# Patient Record
Sex: MALE | Race: WHITE | NOT HISPANIC OR LATINO | URBAN - METROPOLITAN AREA
[De-identification: names, ages, dates, MRNs, and addresses within clinical notes are randomized per-mention and may not be internally consistent; named-entity substitution may affect disease eponyms.]

---

## 2017-07-05 ENCOUNTER — OFFICE VISIT (OUTPATIENT)
Dept: FAMILY MEDICINE | Facility: CLINIC | Age: 52
End: 2017-07-05

## 2017-07-05 VITALS
HEART RATE: 66 BPM | RESPIRATION RATE: 16 BRPM | BODY MASS INDEX: 30.01 KG/M2 | TEMPERATURE: 98.2 F | DIASTOLIC BLOOD PRESSURE: 88 MMHG | HEIGHT: 68 IN | SYSTOLIC BLOOD PRESSURE: 138 MMHG | OXYGEN SATURATION: 98 % | WEIGHT: 198 LBS

## 2017-07-05 DIAGNOSIS — Z01.818 PREOP GENERAL PHYSICAL EXAM: Primary | ICD-10-CM

## 2017-07-05 DIAGNOSIS — M16.11 PRIMARY OSTEOARTHRITIS OF RIGHT HIP: ICD-10-CM

## 2017-07-05 DIAGNOSIS — J45.20 MILD INTERMITTENT ASTHMA WITHOUT COMPLICATION: ICD-10-CM

## 2017-07-05 LAB
% GRANULOCYTES: 78.4 % (ref 42.2–75.2)
HCT VFR BLD AUTO: 43.7 % (ref 39–51)
HEMOGLOBIN: 14.6 G/DL (ref 13.4–17.5)
LYMPHOCYTES NFR BLD AUTO: 16.9 % (ref 20.5–51.1)
MCH RBC QN AUTO: 28.5 PG (ref 27–31)
MCHC RBC AUTO-ENTMCNC: 33.4 G/DL (ref 33–37)
MCV RBC AUTO: 85.4 FL (ref 80–100)
MONOCYTES NFR BLD AUTO: 4.7 % (ref 1.7–9.3)
PLATELET # BLD AUTO: 244 K/UL (ref 140–450)
RBC # BLD AUTO: 5.11 X10/CMM (ref 4.2–5.9)
WBC # BLD AUTO: 8.2 X10/CMM (ref 3.8–11)

## 2017-07-05 PROCEDURE — 85025 COMPLETE CBC W/AUTO DIFF WBC: CPT | Performed by: FAMILY MEDICINE

## 2017-07-05 PROCEDURE — 99215 OFFICE O/P EST HI 40 MIN: CPT | Performed by: FAMILY MEDICINE

## 2017-07-05 PROCEDURE — 36415 COLL VENOUS BLD VENIPUNCTURE: CPT | Performed by: FAMILY MEDICINE

## 2017-07-05 PROCEDURE — 93000 ELECTROCARDIOGRAM COMPLETE: CPT | Performed by: FAMILY MEDICINE

## 2017-07-05 NOTE — PROGRESS NOTES
[facility]  [address] [place] [id number] [phone]  Dept: [phone]    PRE-OP EVALUATION:  Today's date: 2017    Bill Saunders (: 1965) presents for pre-operative evaluation assessment as requested by Dr. ROMERO.  He requires evaluation and anesthesia risk assessment prior to undergoing surgery/procedure for treatment of RIGHT HIP, BONE ON BONE.  Proposed procedure: TOTAL RIGHT HIP REPLACEMENT    Date of Surgery/ Procedure: 2017  Time of Surgery/ Procedure: 7:00 AM  Hospital/Surgical Facility: [facility].     Primary Physician: Mariusz Orellana  Type of Anesthesia Anticipated: to be determined    Patient has a Health Care Directive or Living Will:  YES     1. NO - Do you have a history of heart attack, stroke, stent, bypass or surgery on an artery in the head, neck, heart or legs?  2. NO - Do you ever have any pain or discomfort in your chest?  3. NO - Do you have a history of  Heart Failure?  4. NO - Are you troubled by shortness of breath when: walking on the level, up a slight hill or at night?  5. NO - Do you currently have a cold, bronchitis or other respiratory infection?  6. NO - Do you have a cough, shortness of breath or wheezing?  7. NO - Do you sometimes get pains in the calves of your legs when you walk?  8. NO - Do you or anyone in your family have previous history of blood clots?  9. NO - Do you or does anyone in your family have a serious bleeding problem such as prolonged bleeding following surgeries or cuts?  10. NO - Have you ever had problems with anemia or been told to take iron pills?  11. NO - Have you had any abnormal blood loss such as black, tarry or bloody stools, or abnormal vaginal bleeding?  12. NO - Have you ever had a blood transfusion?  13. NO - Have you or any of your relatives ever had problems with anesthesia?  14. NO - Do you have sleep apnea, excessive snoring or daytime drowsiness?  15. NO - Do you have any prosthetic  heart valves?  16. NO - Do you have prosthetic joints?        HPI:                                                      Brief HPI related to upcoming procedure: end stage OA of right hip    50 yo male with intermittent asthma, well controlled with allergic component      MEDICAL HISTORY:                                                      Patient Active Problem List    Diagnosis Date Noted     Mild intermittent asthma without complication 01/19/2016     Priority: Medium     Rotator cuff injury, right, subsequent encounter 01/19/2016     Priority: Medium     CARDIOVASCULAR SCREENING; LDL GOAL LESS THAN 130 10/31/2010     Family history of prostate cancer 10/27/2009     Dermatophytosis of nail 12/11/2007     Essential hypertension, benign 11/04/2004      Past Medical History:   Diagnosis Date     Essential hypertension, benign      Mild persistent asthma      Past Surgical History:   Procedure Laterality Date     HC REPAIR OF NASAL SEPTUM  1982    repair fracture     REPAIR TENDON ACHILLES  5/1/2010    Left Achilles Tendon     SURGICAL HISTORY OF -   1998    extensor tendon repair right hand     Current Outpatient Prescriptions   Medication Sig Dispense Refill     montelukast (SINGULAIR) 10 MG tablet Take 1 tablet (10 mg) by mouth daily 90 tablet 3     albuterol (PROAIR HFA, PROVENTIL HFA, VENTOLIN HFA) 108 (90 BASE) MCG/ACT inhaler Inhale 2 puffs into the lungs every 6 hours as needed for shortness of breath / dyspnea or wheezing 3 Inhaler 1     Multiple Vitamin (DAILY MULTIVITAMIN PO) Take  by mouth daily.       OTC products: None, except as noted above    Allergies   Allergen Reactions     Iodine Anaphylaxis      Latex Allergy: NO    Social History   Substance Use Topics     Smoking status: Never Smoker     Smokeless tobacco: Never Used     Alcohol use 3.0 oz/week     6 Cans of beer per week     History   Drug Use No       REVIEW OF SYSTEMS:                                                    C: NEGATIVE for fever,  "chills, change in weight  I: NEGATIVE for worrisome rashes, moles or lesions  E: NEGATIVE for vision changes or irritation  E/M: NEGATIVE for ear, mouth and throat problems  R: NEGATIVE for significant cough or SOB  RESP:uses albuterol about once a month  CV: NEGATIVE for chest pain, palpitations or peripheral edema  GI: NEGATIVE for nausea, abdominal pain, heartburn, or change in bowel habits  : NEGATIVE for frequency, dysuria, or hematuria  M: NEGATIVE for significant arthralgias or myalgia  N: NEGATIVE for weakness, dizziness or paresthesias  E: NEGATIVE for temperature intolerance, skin/hair changes  H: NEGATIVE for bleeding problems  P: NEGATIVE for changes in mood or affect    EXAM:                                                    Pulse 66  Temp 98.2  F (36.8  C) (Oral)  Resp 16  Ht 1.727 m (5' 8\")  Wt 89.8 kg (198 lb)  SpO2 98%  BMI 30.11 kg/m2    GENERAL APPEARANCE: healthy, alert and no distress     EYES: EOMI,  PERRL     HENT: ear canals and TM's normal and nose and mouth without ulcers or lesions     NECK: no adenopathy, no asymmetry, masses, or scars and thyroid normal to palpation     RESP: lungs clear to auscultation - no rales, rhonchi or wheezes     CV: regular rates and rhythm, normal S1 S2, no S3 or S4 and no murmur, click or rub     ABDOMEN:  soft, nontender, no HSM or masses and bowel sounds normal     MS: extremities normal- no gross deformities noted, no evidence of inflammation in joints, FROM in all extremities.     SKIN: no suspicious lesions or rashes     NEURO: Normal strength and tone, sensory exam grossly normal, mentation intact and speech normal     PSYCH: mentation appears normal. and affect normal/bright     LYMPHATICS: No axillary, cervical, or supraclavicular nodes    DIAGNOSTICS:                                                    EKG: RBBB, NSR, normal axis, normal intervals, no acute ST/T changes c/w ischemia, no LVH by voltage criteria, unchanged from previous " tracings    Recent Labs   Lab Test  01/19/16   0914  12/17/14   1403  12/17/14   1359  03/21/12   0858  11/30/10   0843   HGB  14.9  14.4   --    --    --    PLT  243  232   --    --    --    NA   --    --    --   148*  138   POTASSIUM   --    --   4.5  4.2  4.6   CR   --    --    --   0.83  0.84      Lab Results   Component Value Date    WBC 8.2 07/05/2017    WBC 5.7 05/25/2010     Lab Results   Component Value Date    RBC 5.11 07/05/2017    RBC 5.14 05/25/2010     Lab Results   Component Value Date    HGB 14.6 07/05/2017     Lab Results   Component Value Date    HCT 43.7 07/05/2017     No components found for: MCT  Lab Results   Component Value Date    MCV 85.4 07/05/2017     Lab Results   Component Value Date    MCH 28.5 07/05/2017     Lab Results   Component Value Date    MCHC 33.4 07/05/2017     Lab Results   Component Value Date    RDW 12.8 05/25/2010     Lab Results   Component Value Date     07/05/2017           IMPRESSION:                                                    Reason for surgery/procedure: end stage arthritis of right hip  Diagnosis/reason for consult: generally healthy male with mild intermittent asthma    The proposed surgical procedure is considered INTERMEDIATE risk.    REVISED CARDIAC RISK INDEX  The patient has the following serious cardiovascular risks for perioperative complications such as (MI, PE, VFib and 3  AV Block):  No serious cardiac risks  INTERPRETATION: 0 risks: Class I (very low risk - 0.4% complication rate)    The patient has the following additional risks for perioperative complications:  No identified additional risks    No diagnosis found.    RECOMMENDATIONS:                                                          --Patient is to take all scheduled medications on the day of surgery EXCEPT for modifications listed below.    Anticoagulant or Antiplatelet Medication Use  NSAIDS: Ibuprofen (Motrin):         Stop one week prior to surgery        APPROVAL GIVEN to  proceed with proposed procedure, without further diagnostic evaluation       Signed Electronically by: Mariusz Orellana MD    Copy of this evaluation report is provided to requesting physician.

## 2017-07-05 NOTE — MR AVS SNAPSHOT
After Visit Summary   7/5/2017    Bill Saunders    MRN: [id number]           Patient Information     Date Of Birth          1965        Visit Information        Provider Department      7/5/2017 10:45 AM Mariusz Orellana MD [facility]        Today's Diagnoses     Preop general physical exam    -  1    Primary osteoarthritis of right hip        Mild intermittent asthma without complication          Care Instructions      Before Your Surgery      Call your surgeon if there is any change in your health. This includes signs of a cold or flu (such as a sore throat, runny nose, cough, rash or fever).    Do not smoke, drink alcohol or take over the counter medicine (unless your surgeon or primary care doctor tells you to) for the 24 hours before and after surgery.    If you take prescribed drugs: Follow your doctor s orders about which medicines to take and which to stop until after surgery.    Eating and drinking prior to surgery: follow the instructions from your surgeon    Take a shower or bath the night before surgery. Use the soap your surgeon gave you to gently clean your skin. If you do not have soap from your surgeon, use your regular soap. Do not shave or scrub the surgery site.  Wear clean pajamas and have clean sheets on your bed.           Follow-ups after your visit        Who to contact     If you have questions or need follow up information about today's clinic visit or your schedule please contact [facility] directly at [phone].  Normal or non-critical lab and imaging results will be communicated to you by MyChart, letter or phone within 4 business days after the clinic has received the results. If you do not hear from us within 7 days, please contact the clinic through MyChart or phone. If you have a critical or abnormal lab result, we will notify you by phone as soon as possible.  Submit refill requests through [employer] or call your pharmacy and they will  "forward the refill request to us. Please allow 3 business days for your refill to be completed.          Additional Information About Your Visit        [employer] Information     [employer] gives you secure access to your electronic health record. If you see a primary care provider, you can also send messages to your care team and make appointments. If you have questions, please call your primary care clinic.  If you do not have a primary care provider, please call [phone] and they will assist you.        Care EveryWhere ID     This is your Care EveryWhere ID. This could be used by other organizations to access your [place] medical records  [id number]        Your Vitals Were     Pulse Temperature Respirations Height Pulse Oximetry BMI (Body Mass Index)    66 98.2  F (36.8  C) (Oral) 16 1.727 m (5' 8\") 98% 30.11 kg/m2       Blood Pressure from Last 3 Encounters:   07/05/17 138/88   01/19/16 140/86   12/17/14 132/78    Weight from Last 3 Encounters:   07/05/17 89.8 kg (198 lb)   01/19/16 89.8 kg (198 lb)   12/17/14 88.5 kg (195 lb)              We Performed the Following     CBC with Diff/Plt ([facility])     EKG 12-lead complete w/read - Clinics        Primary Care Provider Office Phone # Fax #    Mariusz Orellana [phone]       [facility] [address] [facility] [address]        Equal Access to Services     CARMEN LOYOLA AH: Hadii aad ku hadasho Soomaali, waaxda luqadaha, qaybta kaalmada adeegyada, arlin craig haysarahn adeangie estrella la'aan ah. So [facility] [phone].    ATENCIÓN: Si habla español, tiene a webber disposición servicios gratuitos de asistencia lingüística. Sarah al [phone].    We comply with applicable federal civil rights laws and [place] laws. We do not discriminate on the basis of race, color, national origin, age, disability sex, sexual orientation or gender identity.            Thank you!     Thank you for choosing [facility]  for your care. Our goal is " always to provide you with excellent care. Hearing back from our patients is one way we can continue to improve our services. Please take a few minutes to complete the written survey that you may receive in the mail after your visit with us. Thank you!             Your Updated Medication List - Protect others around you: Learn how to safely use, store and throw away your medicines at www.disposemymeds.org.          This list is accurate as of: 7/5/17 12:07 PM.  Always use your most recent med list.                   Brand Name Dispense Instructions for use Diagnosis    albuterol 108 (90 BASE) MCG/ACT Inhaler    PROAIR HFA/PROVENTIL HFA/VENTOLIN HFA    3 Inhaler    Inhale 2 puffs into the lungs every 6 hours as needed for shortness of breath / dyspnea or wheezing    Mild intermittent asthma without complication       DAILY MULTIVITAMIN PO      Take  by mouth daily.    Routine general medical examination at a health care facility       FISH OIL OMEGA-3 PO           Glucosamine--250 MG Tabs

## 2017-10-24 ENCOUNTER — TRANSFERRED RECORDS (OUTPATIENT)
Dept: FAMILY MEDICINE | Facility: CLINIC | Age: 52
End: 2017-10-24

## 2018-01-06 LAB
CHOLEST SERPL-MCNC: 139 MG/DL (ref 0–200)
CREAT SERPL-MCNC: 0.85 MG/DL (ref 0.7–1.33)
HDLC SERPL-MCNC: 39 MG/DL (ref 40–?)
LDLC SERPL CALC-MCNC: 84 MG/DL (ref 0–100)
TRIGL SERPL-MCNC: 74 MG/DL (ref 0–150)

## 2018-07-26 ENCOUNTER — TRANSFERRED RECORDS (OUTPATIENT)
Dept: FAMILY MEDICINE | Facility: CLINIC | Age: 53
End: 2018-07-26

## 2018-07-31 ENCOUNTER — MEDICAL CORRESPONDENCE (OUTPATIENT)
Dept: HEALTH INFORMATION MANAGEMENT | Facility: CLINIC | Age: 53
End: 2018-07-31

## 2018-08-07 ENCOUNTER — HOSPITAL ENCOUNTER (OUTPATIENT)
Dept: LAB | Facility: CLINIC | Age: 53
Discharge: HOME | End: 2018-08-07
Attending: ORTHOPAEDIC SURGERY | Admitting: ORTHOPAEDIC SURGERY
Payer: COMMERCIAL

## 2018-08-07 DIAGNOSIS — Z00.00 ROUTINE GENERAL MEDICAL EXAMINATION AT A HEALTH CARE FACILITY: Primary | ICD-10-CM

## 2018-08-07 LAB — HGB BLD-MCNC: 15 G/DL (ref 13.3–17.7)

## 2018-08-07 PROCEDURE — 85018 HEMOGLOBIN: CPT | Performed by: ORTHOPAEDIC SURGERY

## 2018-08-07 PROCEDURE — 36415 COLL VENOUS BLD VENIPUNCTURE: CPT | Performed by: ORTHOPAEDIC SURGERY

## 2018-10-25 ENCOUNTER — OFFICE VISIT (OUTPATIENT)
Dept: FAMILY MEDICINE | Facility: CLINIC | Age: 53
End: 2018-10-25

## 2018-10-25 ENCOUNTER — TRANSFERRED RECORDS (OUTPATIENT)
Dept: FAMILY MEDICINE | Facility: CLINIC | Age: 53
End: 2018-10-25

## 2018-10-25 VITALS
RESPIRATION RATE: 16 BRPM | WEIGHT: 207.8 LBS | OXYGEN SATURATION: 97 % | TEMPERATURE: 95.8 F | HEIGHT: 68 IN | SYSTOLIC BLOOD PRESSURE: 132 MMHG | BODY MASS INDEX: 31.49 KG/M2 | HEART RATE: 58 BPM | DIASTOLIC BLOOD PRESSURE: 92 MMHG

## 2018-10-25 DIAGNOSIS — I10 ESSENTIAL HYPERTENSION, BENIGN: ICD-10-CM

## 2018-10-25 DIAGNOSIS — M25.552 HIP PAIN, LEFT: ICD-10-CM

## 2018-10-25 DIAGNOSIS — Z23 NEED FOR PROPHYLACTIC VACCINATION AND INOCULATION AGAINST INFLUENZA: ICD-10-CM

## 2018-10-25 DIAGNOSIS — J45.30 MILD PERSISTENT ASTHMA WITHOUT COMPLICATION: ICD-10-CM

## 2018-10-25 DIAGNOSIS — Z01.818 PREOP GENERAL PHYSICAL EXAM: Primary | ICD-10-CM

## 2018-10-25 LAB
% GRANULOCYTES: 66.2 % (ref 42.2–75.2)
HCT VFR BLD AUTO: 44.6 % (ref 39–51)
HEMOGLOBIN: 14.6 G/DL (ref 13.4–17.5)
LYMPHOCYTES NFR BLD AUTO: 26.1 % (ref 20.5–51.1)
MCH RBC QN AUTO: 28.3 PG (ref 27–31)
MCHC RBC AUTO-ENTMCNC: 32.9 G/DL (ref 33–37)
MCV RBC AUTO: 86.2 FL (ref 80–100)
MONOCYTES NFR BLD AUTO: 7.7 % (ref 1.7–9.3)
PLATELET # BLD AUTO: 252 K/UL (ref 140–450)
RBC # BLD AUTO: 5.18 X10/CMM (ref 4.2–5.9)
WBC # BLD AUTO: 6.4 X10/CMM (ref 3.8–11)

## 2018-10-25 PROCEDURE — 93000 ELECTROCARDIOGRAM COMPLETE: CPT | Performed by: FAMILY MEDICINE

## 2018-10-25 PROCEDURE — 90686 IIV4 VACC NO PRSV 0.5 ML IM: CPT | Performed by: FAMILY MEDICINE

## 2018-10-25 PROCEDURE — 87081 CULTURE SCREEN ONLY: CPT | Mod: 90 | Performed by: FAMILY MEDICINE

## 2018-10-25 PROCEDURE — 99215 OFFICE O/P EST HI 40 MIN: CPT | Mod: 25 | Performed by: FAMILY MEDICINE

## 2018-10-25 PROCEDURE — 90471 IMMUNIZATION ADMIN: CPT | Performed by: FAMILY MEDICINE

## 2018-10-25 PROCEDURE — 85025 COMPLETE CBC W/AUTO DIFF WBC: CPT | Performed by: FAMILY MEDICINE

## 2018-10-25 PROCEDURE — 36415 COLL VENOUS BLD VENIPUNCTURE: CPT | Performed by: FAMILY MEDICINE

## 2018-10-25 RX ORDER — MONTELUKAST SODIUM 10 MG/1
1 TABLET ORAL DAILY
Qty: 90 TABLET | Refills: 0 | Status: SHIPPED | OUTPATIENT
Start: 2018-10-25 | End: 2018-12-27

## 2018-10-25 RX ORDER — LISINOPRIL 5 MG/1
5 TABLET ORAL DAILY
Qty: 30 TABLET | Refills: 1 | Status: SHIPPED | OUTPATIENT
Start: 2018-10-25 | End: 2018-12-13

## 2018-10-25 RX ORDER — ALBUTEROL SULFATE 90 UG/1
2 AEROSOL, METERED RESPIRATORY (INHALATION) EVERY 6 HOURS PRN
Qty: 3 INHALER | Refills: 1 | Status: SHIPPED | OUTPATIENT
Start: 2018-10-25 | End: 2020-09-02

## 2018-10-25 NOTE — LETTER
My Asthma Action Plan  Name: Bill Saunders   YOB: 1965  Date: 10/25/2018   My doctor: Juan C Mo MD   My clinic: [facility]        My Control Medicine: Montelukast (Singulair) -  10 mg tablet  My Rescue Medicine: Albuterol (Proair/Ventolin/Proventil) inhaler 108   My Asthma Severity: intermittent  Avoid your asthma triggers: Patient is unaware of triggers               GREEN ZONE   Good Control    I feel good    No cough or wheeze    Can work, sleep and play without asthma symptoms       Take your asthma control medicine every day.     1. If exercise triggers your asthma, take your rescue medication    15 minutes before exercise or sports, and    During exercise if you have asthma symptoms  2. Spacer to use with inhaler: If you have a spacer, make sure to use it with your inhaler             YELLOW ZONE Getting Worse  I have ANY of these:    I do not feel good    Cough or wheeze    Chest feels tight    Wake up at night   1. Keep taking your Green Zone medications  2. Start taking your rescue medicine:    every 20 minutes for up to 1 hour. Then every 4 hours for 24-48 hours.  3. If you stay in the Yellow Zone for more than 12-24 hours, contact your doctor.  4. If you do not return to the Green Zone in 12-24 hours or you get worse, start taking your oral steroid medicine if prescribed by your provider.           RED ZONE Medical Alert - Get Help  I have ANY of these:    I feel awful    Medicine is not helping    Breathing getting harder    Trouble walking or talking    Nose opens wide to breathe       1. Take your rescue medicine NOW  2. If your provider has prescribed an oral steroid medicine, start taking it NOW  3. Call your doctor NOW  4. If you are still in the Red Zone after 20 minutes and you have not reached your doctor:    Take your rescue medicine again and    Call 911 or go to the emergency room right away    See your regular doctor within 2 weeks of an Emergency Room or  Urgent Care visit for follow-up treatment.          Annual Reminders:  Meet with Asthma Educator,  Flu Shot in the Fall, consider Pneumonia Vaccination for patients with asthma (aged 19 and older).    Pharmacy:    [facility] - [facility] [address]  WRITTEN PRESCRIPTION REQUESTED  [facility] - [place], [place] - [address]                      Asthma Triggers  How To Control Things That Make Your Asthma Worse    Triggers are things that make your asthma worse.  Look at the list below to help you find your triggers and what you can do about them.  You can help prevent asthma flare-ups by staying away from your triggers.      Trigger                                                          What you can do   Cigarette Smoke  Tobacco smoke can make asthma worse. Do not allow smoking in your home, car or around you.  Be sure no one smokes at a child s day care or school.  If you smoke, ask your health care provider for ways to help you quit.  Ask family members to quit too.  Ask your health care provider for a referral to Quit Plan to help you quit smoking, or call 0-943-836-PLAN.     Colds, Flu, Bronchitis  These are common triggers of asthma. Wash your hands often.  Don t touch your eyes, nose or mouth.  Get a flu shot every year.     Dust Mites  These are tiny bugs that live in cloth or carpet. They are too small to see. Wash sheets and blankets in hot water every week.   Encase pillows and mattress in dust mite proof covers.  Avoid having carpet if you can. If you have carpet, vacuum weekly.   Use a dust mask and HEPA vacuum.   Pollen and Outdoor Mold  Some people are allergic to trees, grass, or weed pollen, or molds. Try to keep your windows closed.  Limit time out doors when pollen count is high.   Ask you health care provider about taking medicine during allergy season.     Animal Dander  Some people are allergic to skin flakes, urine or saliva from pets with  fur or feathers. Keep pets with fur or feathers out of your home.    If you can t keep the pet outdoors, then keep the pet out of your bedroom.  Keep the bedroom door closed.  Keep pets off cloth furniture and away from stuffed toys.     Mice, Rats, and Cockroaches  Some people are allergic to the waste from these pests.   Cover food and garbage.  Clean up spills and food crumbs.  Store grease in the refrigerator.   Keep food out of the bedroom.   Indoor Mold  This can be a trigger if your home has high moisture. Fix leaking faucets, pipes, or other sources of water.   Clean moldy surfaces.  Dehumidify basement if it is damp and smelly.   Smoke, Strong Odors, and Sprays  These can reduce air quality. Stay away from strong odors and sprays, such as perfume, powder, hair spray, paints, smoke incense, paint, cleaning products, candles and new carpet.   Exercise or Sports  Some people with asthma have this trigger. Be active!  Ask your doctor about taking medicine before sports or exercise to prevent symptoms.    Warm up for 5-10 minutes before and after sports or exercise.     Other Triggers of Asthma  Cold air:  Cover your nose and mouth with a scarf.  Sometimes laughing or crying can be a trigger.  Some medicines and food can trigger asthma.

## 2018-10-25 NOTE — MR AVS SNAPSHOT
After Visit Summary   10/25/2018    Bill Saunders    MRN: [id number]           Patient Information     Date Of Birth          1965        Visit Information        Provider Department      10/25/2018 3:00 PM Juan C Mo MD [facility]        Today's Diagnoses     Preop general physical exam    -  1    Essential hypertension, benign        Hip pain, left        Need for prophylactic vaccination and inoculation against influenza        Mild persistent asthma without complication          Care Instructions      Before Your Surgery      Call your surgeon if there is any change in your health. This includes signs of a cold or flu (such as a sore throat, runny nose, cough, rash or fever).    Do not smoke, drink alcohol or take over the counter medicine (unless your surgeon or primary care doctor tells you to) for the 24 hours before and after surgery.    If you take prescribed drugs: Follow your doctor s orders about which medicines to take and which to stop until after surgery.    Eating and drinking prior to surgery: follow the instructions from your surgeon    Take a shower or bath the night before surgery. Use the soap your surgeon gave you to gently clean your skin. If you do not have soap from your surgeon, use your regular soap. Do not shave or scrub the surgery site.  Wear clean pajamas and have clean sheets on your bed.           Follow-ups after your visit        Who to contact     If you have questions or need follow up information about today's clinic visit or your schedule please contact [facility] directly at [phone].  Normal or non-critical lab and imaging results will be communicated to you by MyChart, letter or phone within 4 business days after the clinic has received the results. If you do not hear from us within 7 days, please contact the clinic through MyChart or phone. If you have a critical or abnormal lab result, we will notify you by phone as  "soon as possible.  Submit refill requests through mVisum or call your pharmacy and they will forward the refill request to us. Please allow 3 business days for your refill to be completed.          Additional Information About Your Visit        [email or web address] Information     [employer]um gives you secure access to your electronic health record. If you see a primary care provider, you can also send messages to your care team and make appointments. If you have questions, please call your primary care clinic.  If you do not have a primary care provider, please call [phone] and they will assist you.        Care EveryWhere ID     This is your Care EveryWhere ID. This could be used by other organizations to access your [facility] medical records  [id number]        Your Vitals Were     Pulse Temperature Respirations Height Pulse Oximetry BMI (Body Mass Index)    58 95.8  F (35.4  C) (Oral) 16 1.722 m (5' 7.79\") 97% 31.8 kg/m2       Blood Pressure from Last 3 Encounters:   10/25/18 (!) 132/92   07/05/17 138/88   01/19/16 140/86    Weight from Last 3 Encounters:   10/25/18 94.3 kg (207 lb 12.8 oz)   07/05/17 89.8 kg (198 lb)   01/19/16 89.8 kg (198 lb)              We Performed the Following     Basic Metabolic Panel (8) ([facility])     CBC with Diff/Plt ([facility])     EKG 12-lead complete w/read - Clinics     FLU VACCINE, SPLIT VIRUS, IM (QUADRIVALENT) [26705]- >3 YRS     MRSA Screening Culture ([facility])     Vaccine Administration, Initial [25733]          Today's Medication Changes          These changes are accurate as of 10/25/18  3:55 PM.  If you have any questions, ask your nurse or doctor.               Start taking these medicines.        Dose/Directions    lisinopril 5 MG tablet   Commonly known as:  PRINIVIL/ZESTRIL   Used for:  Essential hypertension, benign   Started by:  Juan C Mo MD        Dose:  5 mg   Take 1 tablet (5 mg) by mouth daily   Quantity:  30 tablet   Refills:  1       montelukast 10 MG tablet "   Commonly known as:  SINGULAIR   Used for:  Mild persistent asthma without complication   Started by:  Juan C Mo MD        Dose:  1 tablet   Take 1 tablet (10 mg) by mouth daily   Quantity:  90 tablet   Refills:  0         These medicines have changed or have updated prescriptions.        Dose/Directions    * albuterol 108 (90 Base) MCG/ACT inhaler   Commonly known as:  PROAIR HFA/PROVENTIL HFA/VENTOLIN HFA   This may have changed:  Another medication with the same name was added. Make sure you understand how and when to take each.   Used for:  Mild intermittent asthma without complication   Changed by:  Juan C Mo MD        Dose:  2 puff   Inhale 2 puffs into the lungs every 6 hours as needed for shortness of breath / dyspnea or wheezing   Quantity:  3 Inhaler   Refills:  1       * albuterol 108 (90 Base) MCG/ACT inhaler   Commonly known as:  PROAIR HFA/PROVENTIL HFA/VENTOLIN HFA   This may have changed:  You were already taking a medication with the same name, and this prescription was added. Make sure you understand how and when to take each.   Used for:  Mild persistent asthma without complication   Changed by:  Juan C Mo MD        Dose:  2 puff   Inhale 2 puffs into the lungs every 6 hours as needed for shortness of breath / dyspnea or wheezing   Quantity:  3 Inhaler   Refills:  1       * Notice:  This list has 2 medication(s) that are the same as other medications prescribed for you. Read the directions carefully, and ask your doctor or other care provider to review them with you.         Where to get your medicines      These medications were sent to [place] [address] [place] [id number] [address]     Phone:  [phone]     albuterol 108 (90 Base) MCG/ACT inhaler    lisinopril 5 MG tablet    montelukast 10 MG tablet                Primary Care Provider Office Phone # Fax #    Juan C Mo [phone]        [address]  [facility] [address]        Equal Access to Services     JESUSNARA MILLA : Hadii aad gallito hadjulisao Sodudleyali, waaxda luqadaha, qaybta kaalmada natalyarenatoleticia, arlin craig abdiluis m cordovaangie estrella hyacinth acuna. So [facility] [phone].    ATENCIÓN: Si habla español, tiene a webber disposición servicios gratuitos de asistencia lingüística. Llame al [phone].    We comply with applicable federal civil rights laws and [place] laws. We do not discriminate on the basis of race, color, national origin, age, disability, sex, sexual orientation, or gender identity.            Thank you!     Thank you for choosing [facility]  for your care. Our goal is always to provide you with excellent care. Hearing back from our patients is one way we can continue to improve our services. Please take a few minutes to complete the written survey that you may receive in the mail after your visit with us. Thank you!             Your Updated Medication List - Protect others around you: Learn how to safely use, store and throw away your medicines at www.disposemymeds.org.          This list is accurate as of 10/25/18  3:55 PM.  Always use your most recent med list.                   Brand Name Dispense Instructions for use Diagnosis    * albuterol 108 (90 Base) MCG/ACT inhaler    PROAIR HFA/PROVENTIL HFA/VENTOLIN HFA    3 Inhaler    Inhale 2 puffs into the lungs every 6 hours as needed for shortness of breath / dyspnea or wheezing    Mild intermittent asthma without complication       * albuterol 108 (90 Base) MCG/ACT inhaler    PROAIR HFA/PROVENTIL HFA/VENTOLIN HFA    3 Inhaler    Inhale 2 puffs into the lungs every 6 hours as needed for shortness of breath / dyspnea or wheezing    Mild persistent asthma without complication       DAILY MULTIVITAMIN PO      Take  by mouth daily.    Routine general medical examination at a health care facility       FISH OIL OMEGA-3 PO           Glucosamine--250 MG Tabs            lisinopril 5 MG tablet    PRINIVIL/ZESTRIL    30 tablet    Take 1 tablet (5 mg) by mouth daily    Essential hypertension, benign       montelukast 10 MG tablet    SINGULAIR    90 tablet    Take 1 tablet (10 mg) by mouth daily    Mild persistent asthma without complication       VITAMIN B-12 PO           * Notice:  This list has 2 medication(s) that are the same as other medications prescribed for you. Read the directions carefully, and ask your doctor or other care provider to review them with you.

## 2018-10-25 NOTE — PROGRESS NOTES
[facility]  [address] [place] [id number] [phone]  Dept: [phone]    PRE-OP EVALUATION:  Today's date: 10/25/2018    Bill Saunders (: 1965) presents for pre-operative evaluation assessment as requested by Dr. Deacon Puente.  He requires evaluation and anesthesia risk assessment prior to undergoing surgery/procedure for treatment of LEFT Hip Pain .    Proposed Surgery/ Procedure: LEFT Hip Replacement  Date of Surgery/ Procedure: 18  Time of Surgery/ Procedure: 09  Hospital/Surgical Facility: [facility]  Fax number for surgical facility: [phone]  Primary Physician: Juan C Mo  Type of Anesthesia Anticipated: to be determined    Patient has a Health Care Directive or Living Will:  YES     1. NO - Do you have a history of heart attack, stroke, stent, bypass or surgery on an artery in the head, neck, heart or legs?  2. NO - Do you ever have any pain or discomfort in your chest?  3. NO - Do you have a history of  Heart Failure?  4. NO - Are you troubled by shortness of breath when: walking on the level, up a slight hill or at night?  5. NO - Do you currently have a cold, bronchitis or other respiratory infection?  6. NO - Do you have a cough, shortness of breath or wheezing?  7. NO - Do you sometimes get pains in the calves of your legs when you walk?  8. NO - Do you or anyone in your family have previous history of blood clots?  9. NO - Do you or does anyone in your family have a serious bleeding problem such as prolonged bleeding following surgeries or cuts?  10. NO - Have you ever had problems with anemia or been told to take iron pills?  11. NO - Have you had any abnormal blood loss such as black, tarry or bloody stools, or abnormal vaginal bleeding?  12. YES - HAVE YOU EVER HAD A BLOOD TRANSFUSION? When a baby  13. NO - Have you or any of your relatives ever had problems with anesthesia?  14. NO - Do you have sleep apnea, excessive snoring or daytime  drowsiness?  15. NO - Do you have any prosthetic heart valves?  16. YES - DO YOU HAVE PROSTHETIC JOINTS? Right Hip  17. NO - Is there any chance that you may be pregnant?      HPI:     HPI related to upcoming procedure: OA of the left hip, had JOÃO on the right and now ready to have JOÃO on the left      See problem list for active medical problems.  Problems all longstanding and stable, except as noted/documented.  See ROS for pertinent symptoms related to these conditions.                                                                                                                                                          .    MEDICAL HISTORY:     Patient Active Problem List    Diagnosis Date Noted     Mild intermittent asthma without complication 01/19/2016     Priority: Medium     Rotator cuff injury, right, subsequent encounter 01/19/2016     Priority: Medium     CARDIOVASCULAR SCREENING; LDL GOAL LESS THAN 130 10/31/2010     Priority: Medium     Family history of prostate cancer 10/27/2009     Priority: Medium     Dermatophytosis of nail 12/11/2007     Priority: Medium     Essential hypertension, benign 11/04/2004     Priority: Medium      Past Medical History:   Diagnosis Date     Essential hypertension, benign      Mild persistent asthma      Past Surgical History:   Procedure Laterality Date     HC REPAIR OF NASAL SEPTUM  1982    repair fracture     REPAIR TENDON ACHILLES  5/1/2010    Left Achilles Tendon     SURGICAL HISTORY OF -   1998    extensor tendon repair right hand     Current Outpatient Prescriptions   Medication Sig Dispense Refill     albuterol (PROAIR HFA, PROVENTIL HFA, VENTOLIN HFA) 108 (90 BASE) MCG/ACT inhaler Inhale 2 puffs into the lungs every 6 hours as needed for shortness of breath / dyspnea or wheezing 3 Inhaler 1     albuterol (PROAIR HFA/PROVENTIL HFA/VENTOLIN HFA) 108 (90 Base) MCG/ACT inhaler Inhale 2 puffs into the lungs every 6 hours as needed for shortness of breath / dyspnea or  "wheezing 3 Inhaler 1     Cyanocobalamin (VITAMIN B-12 PO)        Glucosamine HCl-MSM (GLUCOSAMINE-MSM) 375-250 MG TABS        lisinopril (PRINIVIL/ZESTRIL) 5 MG tablet Take 1 tablet (5 mg) by mouth daily 30 tablet 1     montelukast (SINGULAIR) 10 MG tablet Take 1 tablet (10 mg) by mouth daily 90 tablet 0     Multiple Vitamin (DAILY MULTIVITAMIN PO) Take  by mouth daily.       Omega-3 Fatty Acids (FISH OIL OMEGA-3 PO)        OTC products: None, except as noted above    Allergies   Allergen Reactions     Other (Do Not Use) Anaphylaxis     PN: LW Other1: -seafood: anaphylatic reaction     Iodine Anaphylaxis      Latex Allergy: NO    Social History   Substance Use Topics     Smoking status: Never Smoker     Smokeless tobacco: Never Used     Alcohol use 3.0 oz/week     6 Cans of beer per week      Comment: 7 per week     History   Drug Use No       REVIEW OF SYSTEMS:   Constitutional, neuro, ENT, endocrine, pulmonary, cardiac, gastrointestinal, genitourinary, musculoskeletal, integument and psychiatric systems are negative, except as otherwise noted.    EXAM:   BP (!) 132/92  Pulse 58  Temp 95.8  F (35.4  C) (Oral)  Resp 16  Ht 1.722 m (5' 7.79\")  Wt 94.3 kg (207 lb 12.8 oz)  SpO2 97%  BMI 31.8 kg/m2    GENERAL APPEARANCE: healthy, alert and no distress     EYES: EOMI,  PERRL     HENT: ear canals and TM's normal and nose and mouth without ulcers or lesions     NECK: no adenopathy, no asymmetry, masses, or scars and thyroid normal to palpation     RESP: lungs clear to auscultation - no rales, rhonchi or wheezes     CV: regular rates and rhythm, normal S1 S2, no S3 or S4 and no murmur, click or rub     ABDOMEN:  soft, nontender, no HSM or masses and bowel sounds normal     MS: extremities normal- no gross deformities noted, no evidence of inflammation in joints, FROM in all extremities.     SKIN: no suspicious lesions or rashes     NEURO: Normal strength and tone, sensory exam grossly normal, mentation intact and " speech normal     PSYCH: mentation appears normal. and affect normal/bright     LYMPHATICS: No cervical adenopathy    DIAGNOSTICS:     EKG: appears normal, NSR, normal axis, normal intervals, no acute ST/T changes c/w ischemia, no LVH by voltage criteria, unchanged from previous tracings  Labs Resulted Today:   Results for orders placed or performed in visit on 10/25/18   CBC with Diff/Plt ([facility])   Result Value Ref Range    WBC x10/cmm 6.4 3.8 - 11.0 x10/cmm    % Lymphocytes 26.1 20.5 - 51.1 %    % Monocytes 7.7 1.7 - 9.3 %    % Granulocytes 66.2 42.2 - 75.2 %    RBC x10/cmm 5.18 4.2 - 5.9 x10/cmm    Hemoglobin 14.6 13.4 - 17.5 g/dl    Hematocrit 44.6 39 - 51 %    MCV 86.2 80 - 100 fL    MCH 28.3 27.0 - 31.0 pg    MCHC 32.9 (A) 33.0 - 37.0 g/dL    Platelet Count 252 140 - 450 K/uL       Recent Labs   Lab Test  08/07/18   1518  07/05/17   1136  12/17/16   0845  01/19/16   0914   12/17/14   1359  03/21/12   0858  11/30/10   0843   HGB  15.0  14.6   --   14.9   < >   --    --    --    PLT   --   244   --   243   < >   --    --    --    NA   --    --    --    --    --    --   148*  138   POTASSIUM   --    --    --    --    --   4.5  4.2  4.6   CR   --    --   0.84   --    --    --   0.83  0.84    < > = values in this interval not displayed.        IMPRESSION:   Reason for surgery/procedure: OA of Left Hip    The proposed surgical procedure is considered INTERMEDIATE risk.    REVISED CARDIAC RISK INDEX  The patient has the following serious cardiovascular risks for perioperative complications such as (MI, PE, VFib and 3  AV Block):  No serious cardiac risks  INTERPRETATION: 0 risks: Class I (very low risk - 0.4% complication rate)    The patient has the following additional risks for perioperative complications:  No identified additional risks      ICD-10-CM    1. Preop general physical exam Z01.818 CBC with Diff/Plt (G)     EKG 12-lead complete w/read - Clinics     Basic Metabolic Panel (8) ([facility])     MRSA Screening  Culture ([facility])   2. Essential hypertension, benign I10 CBC with Diff/Plt ([facility])     EKG 12-lead complete w/read - Clinics     Basic Metabolic Panel (8) ([facility])     lisinopril (PRINIVIL/ZESTRIL) 5 MG tablet   3. Hip pain, left M25.552 MRSA Screening Culture ([facility])   4. Need for prophylactic vaccination and inoculation against influenza Z23 FLU VACCINE, SPLIT VIRUS, IM (QUADRIVALENT) [82041]- >3 YRS     Vaccine Administration, Initial [87160]   5. Mild persistent asthma without complication J45.30 albuterol (PROAIR HFA/PROVENTIL HFA/VENTOLIN HFA) 108 (90 Base) MCG/ACT inhaler     montelukast (SINGULAIR) 10 MG tablet       RECOMMENDATIONS:     --Consult hospital rounder / IM to assist post-op medical management    --Patient is to take all scheduled medications on the day of surgery EXCEPT for modifications listed below.    APPROVAL GIVEN to proceed with proposed procedure, without further diagnostic evaluation       Signed Electronically by: JuanC Mo MD    Copy of this evaluation report is provided to requesting physician.    [facility] Preop Guidelines    Revised Cardiac Risk Index    Injectable Influenza Immunization Documentation    1.  Is the person to be vaccinated sick today?   No    2. Does the person to be vaccinated have an allergy to a component   of the vaccine?   No  Egg Allergy Algorithm Link    3. Has the person to be vaccinated ever had a serious reaction   to influenza vaccine in the past?   No    4. Has the person to be vaccinated ever had Guillain-Barré syndrome?   No    Form completed by Melissa Ernandez CMA

## 2018-10-26 LAB
BUN SERPL-MCNC: 22 MG/DL (ref 6–24)
BUN/CREATININE RATIO: 29 (ref 9–20)
CALCIUM SERPL-MCNC: 8.8 MG/DL (ref 8.7–10.2)
CHLORIDE SERPLBLD-SCNC: 101 MMOL/L (ref 96–106)
CREAT SERPL-MCNC: 0.77 MG/DL (ref 0.76–1.27)
EGFR IF AFRICN AM: 119 ML/MIN/1.73
EGFR IF NONAFRICN AM: 103 ML/MIN/1.73
GLUCOSE SERPL-MCNC: 83 MG/DL (ref 65–99)
POTASSIUM SERPL-SCNC: 4.4 MMOL/L (ref 3.5–5.2)
SODIUM SERPL-SCNC: 140 MMOL/L (ref 134–144)
TOTAL CO2: 25 MMOL/L (ref 20–29)

## 2018-10-26 ASSESSMENT — ASTHMA QUESTIONNAIRES: ACT_TOTALSCORE: 23

## 2018-10-29 LAB — MRSA SCREENING CULTURE: NEGATIVE

## 2018-10-29 NOTE — PROGRESS NOTES
Dear Bill,   I am writing to report that your included test results are within expected ranges. I do not suggest that we make any changes at this time.    uJan C Mo M.D.

## 2018-11-02 NOTE — PROGRESS NOTES
10/29/18 faxed preop and EKG to [facility] @ [phone]    Yobani Regalado,   [facility]  [phone]

## 2018-11-05 ENCOUNTER — OFFICE VISIT (OUTPATIENT)
Dept: FAMILY MEDICINE | Facility: CLINIC | Age: 53
End: 2018-11-05

## 2018-11-05 VITALS
SYSTOLIC BLOOD PRESSURE: 142 MMHG | DIASTOLIC BLOOD PRESSURE: 88 MMHG | WEIGHT: 207 LBS | OXYGEN SATURATION: 96 % | HEART RATE: 74 BPM | TEMPERATURE: 98.2 F | BODY MASS INDEX: 31.67 KG/M2 | RESPIRATION RATE: 16 BRPM

## 2018-11-05 DIAGNOSIS — L03.012 PARONYCHIA OF LEFT THUMB: Primary | ICD-10-CM

## 2018-11-05 PROCEDURE — 99213 OFFICE O/P EST LOW 20 MIN: CPT | Performed by: FAMILY MEDICINE

## 2018-11-05 RX ORDER — CELECOXIB 200 MG/1
CAPSULE ORAL
Refills: 0 | COMMUNITY
Start: 2018-10-31 | End: 2021-11-30

## 2018-11-05 NOTE — PROGRESS NOTES
SUBJECTIVE:   Bill Saunders is a 53 year old male who presents to clinic today for the following health issues:      Infected finger  left hand # thumb nail finger      Duration: yesterday    Description (location/character/radiation): Left thumb, throbbing, discolored, painful, no fever    Job concrete    Intensity:  severe    Accompanying signs and symptoms: none    History (similar episodes/previous evaluation): None    Precipitating or alleviating factors: None    Therapies tried and outcome: soak in epsom salt no relief     Was deer hunting last night and got one.    Per his report he has a dental appointment tonight for which he plans to take Amoxicillin 500 mg 4 tabs as he was directed by other provider.  We discussed that would give him coverage for staph and strep that might be affecting the thumb skin.   In addition I would have him do some bleach soaks and avoid manipulation of the skin/nail at this time.  He is to report fever, increased redness or pain and be seen again should that happen.      Problem list and histories reviewed & adjusted, as indicated.  Additional history: as documented    Patient Active Problem List   Diagnosis     Essential hypertension, benign     Dermatophytosis of nail     Family history of prostate cancer     CARDIOVASCULAR SCREENING; LDL GOAL LESS THAN 130     Mild intermittent asthma without complication     Rotator cuff injury, right, subsequent encounter     Past Surgical History:   Procedure Laterality Date     HC REPAIR OF NASAL SEPTUM  1982    repair fracture     REPAIR TENDON ACHILLES  5/1/2010    Left Achilles Tendon     SURGICAL HISTORY OF -   1998    extensor tendon repair right hand       Social History   Substance Use Topics     Smoking status: Never Smoker     Smokeless tobacco: Never Used     Alcohol use 3.0 oz/week     6 Cans of beer per week      Comment: 7 per week     Family History   Problem Relation Age of Onset     Hypertension Mother      Diabetes  Mother      RICCARDO. Mother      By pass   of CHF AGE 75     Cancer Father      small cell Ca metastasized, BLADDER CA     Prostate Cancer Father 65      age 82     Melanoma Father      Depression Sister      Depression Brother      RICCARDO. Maternal Uncle          Current Outpatient Prescriptions   Medication Sig Dispense Refill     albuterol (PROAIR HFA, PROVENTIL HFA, VENTOLIN HFA) 108 (90 BASE) MCG/ACT inhaler Inhale 2 puffs into the lungs every 6 hours as needed for shortness of breath / dyspnea or wheezing 3 Inhaler 1     albuterol (PROAIR HFA/PROVENTIL HFA/VENTOLIN HFA) 108 (90 Base) MCG/ACT inhaler Inhale 2 puffs into the lungs every 6 hours as needed for shortness of breath / dyspnea or wheezing 3 Inhaler 1     Cyanocobalamin (VITAMIN B-12 PO)        Glucosamine HCl-MSM (GLUCOSAMINE-MSM) 375-250 MG TABS        lisinopril (PRINIVIL/ZESTRIL) 5 MG tablet Take 1 tablet (5 mg) by mouth daily 30 tablet 1     montelukast (SINGULAIR) 10 MG tablet Take 1 tablet (10 mg) by mouth daily 90 tablet 0     Multiple Vitamin (DAILY MULTIVITAMIN PO) Take  by mouth daily.       Omega-3 Fatty Acids (FISH OIL OMEGA-3 PO)        Allergies   Allergen Reactions     Other (Do Not Use) Anaphylaxis     PN: LW Other1: -seafood: anaphylatic reaction     Iodine Anaphylaxis     Recent Labs   Lab Test  10/25/18   1600  16   0845  14   1359  12   0858  11/30/10   0843   LDL   --   107   --   102  117   HDL   --   51   --   42  40   TRIG   --   75   --   84  63   ALT   --    --    --   27  33   CR  0.77  0.84   --   0.83  0.84   GFRESTIMATED   --   101   --   >90  >90   GFRESTBLACK   --   117   --   >90  >90   POTASSIUM  4.4   --   4.5  4.2  4.6      BP Readings from Last 3 Encounters:   18 142/88   10/25/18 (!) 132/92   17 138/88    Wt Readings from Last 3 Encounters:   18 93.9 kg (207 lb)   10/25/18 94.3 kg (207 lb 12.8 oz)   17 89.8 kg (198 lb)                  Labs reviewed in  EPIC    Reviewed and updated as needed this visit by clinical staff       Reviewed and updated as needed this visit by Provider         ROS:  Constitutional, HEENT, cardiovascular, pulmonary, GI, , musculoskeletal, neuro, skin, endocrine and psych systems are negative, except as otherwise noted.    OBJECTIVE:     /88  Pulse 74  Temp 98.2  F (36.8  C)  Resp 16  Wt 93.9 kg (207 lb)  SpO2 96%  BMI 31.67 kg/m2  There is no height or weight on file to calculate BMI.  GENERAL: healthy, alert and no distress  Left base of thumb nail pink without pus tender mildly to palpation at tip of thumb. ?black streak undernail.  Normal pulses.  Normal ROM hand/finger/thumb.  No streaking.        Diagnostic Test Results:  Results for orders placed or performed in visit on 10/25/18   CBC with Diff/Plt (G)   Result Value Ref Range    WBC x10/cmm 6.4 3.8 - 11.0 x10/cmm    % Lymphocytes 26.1 20.5 - 51.1 %    % Monocytes 7.7 1.7 - 9.3 %    % Granulocytes 66.2 42.2 - 75.2 %    RBC x10/cmm 5.18 4.2 - 5.9 x10/cmm    Hemoglobin 14.6 13.4 - 17.5 g/dl    Hematocrit 44.6 39 - 51 %    MCV 86.2 80 - 100 fL    MCH 28.3 27.0 - 31.0 pg    MCHC 32.9 (A) 33.0 - 37.0 g/dL    Platelet Count 252 140 - 450 K/uL   Basic Metabolic Panel (8) ([facility])   Result Value Ref Range    Glucose 83 65 - 99 mg/dL    Urea Nitrogen 22 6 - 24 mg/dL    Creatinine 0.77 0.76 - 1.27 mg/dL    eGFR If NonAfricn Am 103 >59 mL/min/1.73    eGFR If Africn Am 119 >59 mL/min/1.73    BUN/Creatinine Ratio 29 (H) 9 - 20    Sodium 140 134 - 144 mmol/L    Potassium 4.4 3.5 - 5.2 mmol/L    Chloride 101 96 - 106 mmol/L    Total CO2 25 20 - 29 mmol/L    Calcium 8.8 8.7 - 10.2 mg/dL    Narrative    Performed at:  01 - [facility] [address], [place], [place]  [id number]  : Galindo Bermudez MD, Phone:  [phone]   MRSA Screening Culture ([facility])   Result Value Ref Range    MRSA Screening Culture Negative     Narrative    Performed at:  01 - [facility] [address]  [place], [place]  [id number]  : Galindo Bermudez MD, Phone:  [phone]       ASSESSMENT/PLAN:     ASSESSMENT / PLAN:  (L03.012) Paronychia of left thumb  (primary encounter diagnosis)  Comment: early mild  Plan: see patient instructions        Patient Instructions   You are planning to take Amoxicillin 500 mg 4 caps one hour before dentist tonight (this should cover bacterial for your thumb as well)    You could also do bleach soak. 1 teaspoon bleach. (no poking, sticking it with a pin or cutting nail/cuticle.)    If fever, redness, streaking be seen again.          Veronica Blevins MD  [facility]

## 2018-11-05 NOTE — MR AVS SNAPSHOT
After Visit Summary   11/5/2018    Bill Saunders    MRN: [id number]           Patient Information     Date Of Birth          1965        Visit Information        Provider Department      11/5/2018 10:30 AM Veronica Blevins MD [facility]        Care Instructions    You are planning to take Amoxicillin 500 mg 4 caps one hour before dentist tonight    You could also do bleach soak. 1 teaspoon bleach.     If fever, redness, streaking be seen again.              Follow-ups after your visit        Who to contact     If you have questions or need follow up information about today's clinic visit or your schedule please contact [facility] directly at [phone].  Normal or non-critical lab and imaging results will be communicated to you by Scribzhart, letter or phone within 4 business days after the clinic has received the results. If you do not hear from us within 7 days, please contact the clinic through [employer] or phone. If you have a critical or abnormal lab result, we will notify you by phone as soon as possible.  Submit refill requests through [employer] or call your pharmacy and they will forward the refill request to us. Please allow 3 business days for your refill to be completed.          Additional Information About Your Visit        MyChart Information     [employer] gives you secure access to your electronic health record. If you see a primary care provider, you can also send messages to your care team and make appointments. If you have questions, please call your primary care clinic.  If you do not have a primary care provider, please call [phone] and they will assist you.        Care EveryWhere ID     This is your Care EveryWhere ID. This could be used by other organizations to access your [place] medical records  [id number]        Your Vitals Were     Pulse Temperature Respirations Pulse Oximetry BMI (Body Mass Index)       74 98.2  F (36.8  C) 16 96% 31.67  kg/m2        Blood Pressure from Last 3 Encounters:   11/05/18 142/88   10/25/18 (!) 132/92   07/05/17 138/88    Weight from Last 3 Encounters:   11/05/18 93.9 kg (207 lb)   10/25/18 94.3 kg (207 lb 12.8 oz)   07/05/17 89.8 kg (198 lb)              Today, you had the following     No orders found for display       Primary Care Provider Office Phone # Fax #    Juan C Mo [phone] [address]  [facility] [address]        Equal Access to Services     [facility]: Hadii lorenzo conti hadthanh Somalena, waaxda luqadaha, qaybta kaalmada adeangieyada, arlin claros . So [facility] [phone].    ATENCIÓN: Si habla español, tiene a webber disposición servicios gratuitos de asistencia lingüística. [facility] [phone].    We comply with applicable federal civil rights laws and [place] laws. We do not discriminate on the basis of race, color, national origin, age, disability, sex, sexual orientation, or gender identity.            Thank you!     Thank you for choosing [facility]  for your care. Our goal is always to provide you with excellent care. Hearing back from our patients is one way we can continue to improve our services. Please take a few minutes to complete the written survey that you may receive in the mail after your visit with us. Thank you!             Your Updated Medication List - Protect others around you: Learn how to safely use, store and throw away your medicines at www.disposemymeds.org.          This list is accurate as of 11/5/18 11:04 AM.  Always use your most recent med list.                   Brand Name Dispense Instructions for use Diagnosis    * albuterol 108 (90 Base) MCG/ACT inhaler    PROAIR HFA/PROVENTIL HFA/VENTOLIN HFA    3 Inhaler    Inhale 2 puffs into the lungs every 6 hours as needed for shortness of breath / dyspnea or wheezing    Mild intermittent asthma without complication       * albuterol 108 (90 Base) MCG/ACT  inhaler    PROAIR HFA/PROVENTIL HFA/VENTOLIN HFA    3 Inhaler    Inhale 2 puffs into the lungs every 6 hours as needed for shortness of breath / dyspnea or wheezing    Mild persistent asthma without complication       celecoxib 200 MG capsule    celeBREX     TAKE ONE CAPSULE BY MOUTH ONCE DAILY        DAILY MULTIVITAMIN PO      Take  by mouth daily.    Routine general medical examination at a health care facility       FISH OIL OMEGA-3 PO           Glucosamine--250 MG Tabs           lisinopril 5 MG tablet    PRINIVIL/ZESTRIL    30 tablet    Take 1 tablet (5 mg) by mouth daily    Essential hypertension, benign       montelukast 10 MG tablet    SINGULAIR    90 tablet    Take 1 tablet (10 mg) by mouth daily    Mild persistent asthma without complication       VITAMIN B-12 PO           * Notice:  This list has 2 medication(s) that are the same as other medications prescribed for you. Read the directions carefully, and ask your doctor or other care provider to review them with you.

## 2018-11-05 NOTE — PATIENT INSTRUCTIONS
You are planning to take Amoxicillin 500 mg 4 caps one hour before dentist tonight (this should cover bacterial for your thumb as well)    You could also do bleach soak. 1 teaspoon bleach. (no poking, sticking it with a pin or cutting nail/cuticle.)    If fever, redness, streaking be seen again.

## 2018-11-09 ENCOUNTER — TRANSFERRED RECORDS (OUTPATIENT)
Dept: FAMILY MEDICINE | Facility: CLINIC | Age: 53
End: 2018-11-09

## 2018-12-11 ENCOUNTER — TRANSFERRED RECORDS (OUTPATIENT)
Dept: FAMILY MEDICINE | Facility: CLINIC | Age: 53
End: 2018-12-11

## 2018-12-13 DIAGNOSIS — I10 ESSENTIAL HYPERTENSION, BENIGN: ICD-10-CM

## 2018-12-13 RX ORDER — LISINOPRIL 5 MG/1
5 TABLET ORAL DAILY
Qty: 30 TABLET | Refills: 1 | Status: SHIPPED | OUTPATIENT
Start: 2018-12-13 | End: 2018-12-17

## 2018-12-17 DIAGNOSIS — I10 ESSENTIAL HYPERTENSION, BENIGN: ICD-10-CM

## 2018-12-18 RX ORDER — LISINOPRIL 5 MG/1
5 TABLET ORAL DAILY
Qty: 90 TABLET | Refills: 3 | Status: SHIPPED | OUTPATIENT
Start: 2018-12-18 | End: 2020-01-12

## 2018-12-27 DIAGNOSIS — J45.30 MILD PERSISTENT ASTHMA WITHOUT COMPLICATION: ICD-10-CM

## 2018-12-27 RX ORDER — MONTELUKAST SODIUM 10 MG/1
1 TABLET ORAL DAILY
Qty: 90 TABLET | Refills: 0 | Status: SHIPPED | OUTPATIENT
Start: 2018-12-27 | End: 2020-12-11

## 2019-01-22 ENCOUNTER — TRANSFERRED RECORDS (OUTPATIENT)
Dept: FAMILY MEDICINE | Facility: CLINIC | Age: 54
End: 2019-01-22

## 2019-02-07 ENCOUNTER — APPOINTMENT (OUTPATIENT)
Dept: NUCLEAR MEDICINE | Facility: CLINIC | Age: 54
End: 2019-02-07
Attending: EMERGENCY MEDICINE
Payer: COMMERCIAL

## 2019-02-07 ENCOUNTER — APPOINTMENT (OUTPATIENT)
Dept: GENERAL RADIOLOGY | Facility: CLINIC | Age: 54
End: 2019-02-07
Attending: EMERGENCY MEDICINE
Payer: COMMERCIAL

## 2019-02-07 ENCOUNTER — HOSPITAL ENCOUNTER (EMERGENCY)
Facility: CLINIC | Age: 54
Discharge: HOME | End: 2019-02-07
Attending: EMERGENCY MEDICINE | Admitting: EMERGENCY MEDICINE
Payer: COMMERCIAL

## 2019-02-07 VITALS
WEIGHT: 205 LBS | SYSTOLIC BLOOD PRESSURE: 140 MMHG | DIASTOLIC BLOOD PRESSURE: 82 MMHG | HEIGHT: 69 IN | RESPIRATION RATE: 14 BRPM | OXYGEN SATURATION: 98 % | BODY MASS INDEX: 30.36 KG/M2 | TEMPERATURE: 97.5 F | HEART RATE: 63 BPM

## 2019-02-07 DIAGNOSIS — R07.9 CHEST PAIN, UNSPECIFIED TYPE: ICD-10-CM

## 2019-02-07 LAB
ANION GAP SERPL CALCULATED.3IONS-SCNC: 5 MMOL/L (ref 3–14)
BASOPHILS # BLD AUTO: 0 10E9/L (ref 0–0.2)
BASOPHILS NFR BLD AUTO: 0.2 %
BUN SERPL-MCNC: 21 MG/DL (ref 7–30)
CALCIUM SERPL-MCNC: 8.4 MG/DL (ref 8.5–10.1)
CHLORIDE SERPL-SCNC: 109 MMOL/L (ref 94–109)
CO2 SERPL-SCNC: 26 MMOL/L (ref 20–32)
CREAT SERPL-MCNC: 0.73 MG/DL (ref 0.66–1.25)
D DIMER PPP FEU-MCNC: 1.1 UG/ML FEU (ref 0–0.5)
DIFFERENTIAL METHOD BLD: NORMAL
EOSINOPHIL # BLD AUTO: 0.1 10E9/L (ref 0–0.7)
EOSINOPHIL NFR BLD AUTO: 1.8 %
ERYTHROCYTE [DISTWIDTH] IN BLOOD BY AUTOMATED COUNT: 13.5 % (ref 10–15)
GFR SERPL CREATININE-BSD FRML MDRD: >90 ML/MIN/{1.73_M2}
GLUCOSE SERPL-MCNC: 95 MG/DL (ref 70–99)
HCT VFR BLD AUTO: 42.3 % (ref 40–53)
HGB BLD-MCNC: 14.5 G/DL (ref 13.3–17.7)
IMM GRANULOCYTES # BLD: 0 10E9/L (ref 0–0.4)
IMM GRANULOCYTES NFR BLD: 0.2 %
INTERPRETATION ECG - MUSE: NORMAL
LYMPHOCYTES # BLD AUTO: 1.3 10E9/L (ref 0.8–5.3)
LYMPHOCYTES NFR BLD AUTO: 23.1 %
MCH RBC QN AUTO: 29.4 PG (ref 26.5–33)
MCHC RBC AUTO-ENTMCNC: 34.3 G/DL (ref 31.5–36.5)
MCV RBC AUTO: 86 FL (ref 78–100)
MONOCYTES # BLD AUTO: 0.6 10E9/L (ref 0–1.3)
MONOCYTES NFR BLD AUTO: 11.2 %
NEUTROPHILS # BLD AUTO: 3.5 10E9/L (ref 1.6–8.3)
NEUTROPHILS NFR BLD AUTO: 63.5 %
NRBC # BLD AUTO: 0 10*3/UL
NRBC BLD AUTO-RTO: 0 /100
PLATELET # BLD AUTO: 234 10E9/L (ref 150–450)
POTASSIUM SERPL-SCNC: 4.3 MMOL/L (ref 3.4–5.3)
RBC # BLD AUTO: 4.94 10E12/L (ref 4.4–5.9)
SODIUM SERPL-SCNC: 140 MMOL/L (ref 133–144)
TROPONIN I SERPL-MCNC: <0.015 UG/L (ref 0–0.04)
WBC # BLD AUTO: 5.5 10E9/L (ref 4–11)

## 2019-02-07 PROCEDURE — 78580 LUNG PERFUSION IMAGING: CPT

## 2019-02-07 PROCEDURE — 93005 ELECTROCARDIOGRAM TRACING: CPT

## 2019-02-07 PROCEDURE — 99285 EMERGENCY DEPT VISIT HI MDM: CPT | Mod: 25

## 2019-02-07 PROCEDURE — 71045 X-RAY EXAM CHEST 1 VIEW: CPT

## 2019-02-07 PROCEDURE — 84484 ASSAY OF TROPONIN QUANT: CPT | Performed by: EMERGENCY MEDICINE

## 2019-02-07 PROCEDURE — 85025 COMPLETE CBC W/AUTO DIFF WBC: CPT | Performed by: EMERGENCY MEDICINE

## 2019-02-07 PROCEDURE — 25000132 ZZH RX MED GY IP 250 OP 250 PS 637: Performed by: EMERGENCY MEDICINE

## 2019-02-07 PROCEDURE — A9540 TC99M MAA: HCPCS | Performed by: EMERGENCY MEDICINE

## 2019-02-07 PROCEDURE — 80048 BASIC METABOLIC PNL TOTAL CA: CPT | Performed by: EMERGENCY MEDICINE

## 2019-02-07 PROCEDURE — 85379 FIBRIN DEGRADATION QUANT: CPT | Performed by: EMERGENCY MEDICINE

## 2019-02-07 PROCEDURE — 34300033 ZZH RX 343: Performed by: EMERGENCY MEDICINE

## 2019-02-07 RX ORDER — ASPIRIN 81 MG/1
324 TABLET, CHEWABLE ORAL ONCE
Status: COMPLETED | OUTPATIENT
Start: 2019-02-07 | End: 2019-02-07

## 2019-02-07 RX ADMIN — Medication 3.1 MILLICURIE: at 08:42

## 2019-02-07 RX ADMIN — ASPIRIN 81 MG 324 MG: 81 TABLET ORAL at 07:22

## 2019-02-07 ASSESSMENT — ENCOUNTER SYMPTOMS
SHORTNESS OF BREATH: 1
NAUSEA: 0
VOMITING: 0
DIZZINESS: 0

## 2019-02-07 ASSESSMENT — MIFFLIN-ST. JEOR: SCORE: 1760.25

## 2019-02-07 NOTE — ED AVS SNAPSHOT
Emergency Department  [address] [place] [address]  Phone:  [phone]  Fax:  [phone]                                    Bill Saunders   MRN: [id number]    Department:   Emergency Department   Date of Visit:  2/7/2019           After Visit Summary Signature Page    I have received my discharge instructions, and my questions have been answered. I have discussed any challenges I see with this plan with the nurse or doctor.    ..........................................................................................................................................  Patient/Patient Representative Signature      ..........................................................................................................................................  Patient Representative Print Name and Relationship to Patient    ..................................................               ................................................  Date                                   Time    ..........................................................................................................................................  Reviewed by Signature/Title    ...................................................              ..............................................  Date                                               Time          22EPIC Rev 08/18

## 2019-02-07 NOTE — ED PROVIDER NOTES
"  History     Chief Complaint:  Chest pain    HPI   Bill Saunders is a 53 year old male with a history of hypertension and asthma who presents with his wife to the emergency department with concern for chest pain. The patient reports that he has intermittent sharp \"pencil point\" left sided chest pain which then radiates outward and becomes dull. He states that he works out regularly and does not have this pain ever while working out. This morning he woke up with a dull left sided chest pain, which gradually increased, prompting him to take an ibuprofen over an hour ago, and with persistence of his pain, he drove here, and en route his pain subsided partially. He notes some shortness of breath over the last several months, but states he is always short of breath in the winter, which he attributes to his history of asthma. He denies any leg swelling, nausea, or emesis. He notes dizziness four days ago when working out. Of note, his last stress test was years ago.     Cardiac/PE/DVT Risk Factors:  The patient has a history of hypertension, but not hyperlipidemia, diabetes, and formerly smoking. He reports no family history of heart disease. The patient denies any personal or familial history of PE, DVT, or clotting disorder. The patient had a 3hour car trip (each way) 10 days ago and had a hip replacement several months ago on .        Allergies:  Iodine    Medications:    Albuterol  Lisinopril  Singulair  Celebrex     Past Medical History:    HTN  Asthma    Past Surgical History:    Repair nasal septum fracture  Left achilles tendon  Extensor tendon repair, right hand    Family History:    HTN  CAD  DM  Depression    Social History:  Former smoker  A beers per week     Review of Systems   Respiratory: Positive for shortness of breath.    Cardiovascular: Positive for chest pain. Negative for leg swelling.   Gastrointestinal: Negative for nausea and vomiting.   Neurological: Negative for dizziness. " "  All other systems reviewed and are negative.      Physical Exam     Patient Vitals for the past 24 hrs:   BP Temp Temp src Heart Rate Resp SpO2 Height Weight   02/07/19 0759 -- -- -- 66 13 97 % -- --   02/07/19 0738 -- -- -- 67 14 96 % -- --   02/07/19 0727 -- -- -- 67 -- 96 % -- --   02/07/19 0726 -- -- -- 71 14 -- -- --   02/07/19 0652 152/89 97.5  F (36.4  C) Oral 81 16 100 % 1.753 m (5' 9\") 93 kg (205 lb)         Physical Exam  General/Appearance: appears stated age, well-groomed, appears comfortable  Eyes: EOMI, no scleral injection, no icterus  ENT: MMM  Neck: supple, nl ROM, no stiffness  Cardiovascular: RRR, nl S1S2, no m/r/g, 2+ pulses in all 4 extremities, cap refill <2sec  Respiratory: CTAB, good air movement throughout, no wheezes/rhonchi/rales, no increased WOB, no retractions  Back: no lesions  GI: abd soft, non-distended, nttp,  no HSM, no rebound, no guarding, nl BS  MSK: PERDUE, good tone, no bony abnormality  Skin: warm and well-perfused, no rash, no edema, no ecchymosis, nl turgor  Neuro: GCS 15, alert and oriented, no gross focal neuro deficits  Psych: interacts appropriately  Heme: no petechia, no purpura, no active bleeding    Emergency Department Course   ECG:  Indication: chest pain   Time: 0703  Vent. Rate 72 bpm. VT interval 146. QRS duration 134. QT/QTc 416/455. P-R-T axis 57 47 43.  Normal sinus rhythm. Normal RBBB. Abnormal ECG.RBBB newly noted compared to EKG 9.11.2008. Read time: 0710.     Imaging:  Radiographic findings were communicated with the patient and family who voiced understanding of the findings.    XR chest portable  No acute cardiopulmonary disease.    NM Lung scan perfusion particulate  Low probability for pulmonary embolism.      Laboratory:  CBC: WBC: 5.5, HGB: 14.5, PLT: 234  BMP: , Calcium: 8.4, o/w WNL (Creatinine: 0.73)    0707 Troponin: <0.015   D dimer: 1.1  Interventions:  0722 Aspirin 324 mg Oral    Medications   aspirin (ASA) chewable tablet 324 mg (324 mg Oral " Given 2/7/19 0722)   technetium pertechnetate with albumin (Tc99m MAA) radioisotope injection 3 millicurie (3.1 millicuries Intravenous Given 2/7/19 0842)       Emergency Department Course:  Nursing notes and vitals reviewed. (2351) I performed an exam of the patient as documented above.     IV inserted. Medicine administered as documented above. Blood drawn. This was sent to the lab for further testing, results above.    (2962) I am informed that radiology will not obtain CT because of a possible former allergy to iodine.     The patient was sent for a NM Chest while in the emergency department, findings above.     (3374) I rechecked the patient and discussed the results of his workup thus far.       I personally reviewed the imaging results with the Patient and spouse and answered all related questions prior to discharge.       Impression & Plan      Medical Decision Making:  This patient is a 53-year-old male with history of hypertension who presents with chest pain this been intermittent over the past several weeks.  Notably it has not been associated with exertion.  It is atypical in its pattern of presentation and that it spontaneous and spontaneously resolves.  He has noted some increased shortness of breath over the past several weeks to months and notably did have a right hip replacement back in November.  He also took a 3-6-hour car trip 10 days ago.  Because of this I think a d-dimer was warranted.  His number was elevated.  A CTA was originally ordered however he has a possible anaphylactic allergy to iodine.  It is unclear, per his report, if this was truly an allergic reaction to Betadine versus an anxiety reaction to the situation.  I have advised them to at least discuss with her physician the possibility of getting allergy tested for this as there are many tests where, such as CT for acute stroke, it could make a potential large difference.  In the meantime we ordered a VQ scan.  This was read as  negative.  His EKG shows right bundle branch block but no ST or T wave changes.  His troponin is negative.  At this time I think he is a low to low moderate risk on the heart score given his age, male sex, and one risk factor.  I think is appropriate for outpatient workup but I do think it should be followed up with a stress test.  This is been discussed in detail with the patient and his wife and they both feel comfortable with this plan.    Diagnosis:    ICD-10-CM    1. Chest pain, unspecified type R07.9 Exercise Stress Echocardiogram       Disposition:  discharged to home    Discharge Medications:     Medication List      There are no discharge medications for this visit.         Scribe Disclosure:  I, Nikolas Hyde, am serving as a scribe on 2/7/2019 at 6:55 AM to personally document services performed by Tonya Marroqiun* based on my observations and the provider's statements to me.      Tonya Marroquin MD  02/07/19 0911

## 2019-02-11 ENCOUNTER — HOSPITAL ENCOUNTER (OUTPATIENT)
Dept: CARDIOLOGY | Facility: CLINIC | Age: 54
Discharge: HOME | End: 2019-02-11
Attending: EMERGENCY MEDICINE | Admitting: EMERGENCY MEDICINE
Payer: COMMERCIAL

## 2019-02-11 DIAGNOSIS — R07.9 CHEST PAIN, UNSPECIFIED TYPE: ICD-10-CM

## 2019-02-11 PROCEDURE — 93325 DOPPLER ECHO COLOR FLOW MAPG: CPT | Mod: 26 | Performed by: INTERNAL MEDICINE

## 2019-02-11 PROCEDURE — 93018 CV STRESS TEST I&R ONLY: CPT | Performed by: INTERNAL MEDICINE

## 2019-02-11 PROCEDURE — 25500064 ZZH RX 255 OP 636: Performed by: EMERGENCY MEDICINE

## 2019-02-11 PROCEDURE — 93321 DOPPLER ECHO F-UP/LMTD STD: CPT | Mod: 26 | Performed by: INTERNAL MEDICINE

## 2019-02-11 PROCEDURE — 93016 CV STRESS TEST SUPVJ ONLY: CPT | Performed by: INTERNAL MEDICINE

## 2019-02-11 PROCEDURE — 40000264 ECHO STRESS ECHOCARDIOGRAM

## 2019-02-11 PROCEDURE — 93350 STRESS TTE ONLY: CPT | Mod: 26 | Performed by: INTERNAL MEDICINE

## 2019-02-11 RX ADMIN — HUMAN ALBUMIN MICROSPHERES AND PERFLUTREN 6 ML: 10; .22 INJECTION, SOLUTION INTRAVENOUS at 09:30

## 2019-04-06 ENCOUNTER — TRANSFERRED RECORDS (OUTPATIENT)
Dept: FAMILY MEDICINE | Facility: CLINIC | Age: 54
End: 2019-04-06

## 2019-04-06 LAB
CHOLEST SERPL-MCNC: 165 MG/DL (ref 0–200)
CREAT SERPL-MCNC: 0.87 MG/DL (ref 0.7–1.33)
HDLC SERPL-MCNC: 48 MG/DL (ref 40–?)
LDLC SERPL CALC-MCNC: 98 MG/DL (ref 0–100)
NONHDLC SERPL-MCNC: 117 MG/DL (ref ?–130)
PSA SERPL-MCNC: 1.4 NG/ML (ref 0–4)
TRIGL SERPL-MCNC: 95 MG/DL (ref 0–150)

## 2019-10-02 ENCOUNTER — HEALTH MAINTENANCE LETTER (OUTPATIENT)
Age: 54
End: 2019-10-02

## 2019-12-05 ENCOUNTER — TRANSFERRED RECORDS (OUTPATIENT)
Dept: FAMILY MEDICINE | Facility: CLINIC | Age: 54
End: 2019-12-05

## 2020-01-11 DIAGNOSIS — I10 ESSENTIAL HYPERTENSION, BENIGN: ICD-10-CM

## 2020-01-12 RX ORDER — LISINOPRIL 5 MG/1
TABLET ORAL
Qty: 90 TABLET | Refills: 3 | Status: SHIPPED | OUTPATIENT
Start: 2020-01-12 | End: 2021-01-13

## 2020-09-21 DIAGNOSIS — J45.30 MILD PERSISTENT ASTHMA WITHOUT COMPLICATION: ICD-10-CM

## 2020-09-21 RX ORDER — ALBUTEROL SULFATE 90 UG/1
AEROSOL, METERED RESPIRATORY (INHALATION)
Qty: 18 INHALER | Refills: 0 | Status: SHIPPED | OUTPATIENT
Start: 2020-09-21 | End: 2023-03-20

## 2020-12-05 ENCOUNTER — TRANSFERRED RECORDS (OUTPATIENT)
Dept: FAMILY MEDICINE | Facility: CLINIC | Age: 55
End: 2020-12-05

## 2020-12-05 LAB
CHOLEST SERPL-MCNC: 169 MG/DL (ref 0–200)
CREAT SERPL-MCNC: 0.85 MG/DL (ref 0.7–1.33)
GFR SERPL CREATININE-BSD FRML MDRD: NORMAL ML/MIN/{1.73_M2}
HDLC SERPL-MCNC: 44 MG/DL (ref 40–?)
LDLC SERPL CALC-MCNC: 108 MG/DL (ref 0–100)
NONHDLC SERPL-MCNC: ABNORMAL MG/DL
PSA SERPL-MCNC: 0.9 NG/ML (ref 0–4)
TRIGL SERPL-MCNC: 78 MG/DL (ref 0–150)

## 2020-12-11 ENCOUNTER — OFFICE VISIT (OUTPATIENT)
Dept: FAMILY MEDICINE | Facility: CLINIC | Age: 55
End: 2020-12-11

## 2020-12-11 VITALS
HEART RATE: 58 BPM | RESPIRATION RATE: 16 BRPM | OXYGEN SATURATION: 97 % | DIASTOLIC BLOOD PRESSURE: 78 MMHG | BODY MASS INDEX: 32.77 KG/M2 | WEIGHT: 221.25 LBS | SYSTOLIC BLOOD PRESSURE: 124 MMHG | HEIGHT: 69 IN | TEMPERATURE: 97.9 F

## 2020-12-11 DIAGNOSIS — I10 ESSENTIAL HYPERTENSION: ICD-10-CM

## 2020-12-11 DIAGNOSIS — R23.4 FISSURE IN SKIN OF FOOT: ICD-10-CM

## 2020-12-11 DIAGNOSIS — I45.10 RBBB (RIGHT BUNDLE BRANCH BLOCK): ICD-10-CM

## 2020-12-11 DIAGNOSIS — Z00.00 ROUTINE GENERAL MEDICAL EXAMINATION AT A HEALTH CARE FACILITY: Primary | ICD-10-CM

## 2020-12-11 DIAGNOSIS — J45.30 MILD PERSISTENT ASTHMA WITHOUT COMPLICATION: ICD-10-CM

## 2020-12-11 PROCEDURE — 93050 ART PRESSURE WAVEFORM ANALYS: CPT | Performed by: NURSE PRACTITIONER

## 2020-12-11 PROCEDURE — 99213 OFFICE O/P EST LOW 20 MIN: CPT | Mod: 25 | Performed by: NURSE PRACTITIONER

## 2020-12-11 PROCEDURE — 99396 PREV VISIT EST AGE 40-64: CPT | Performed by: NURSE PRACTITIONER

## 2020-12-11 RX ORDER — MONTELUKAST SODIUM 10 MG/1
1 TABLET ORAL DAILY
Qty: 90 TABLET | Refills: 3 | Status: SHIPPED | OUTPATIENT
Start: 2020-12-11 | End: 2022-01-04

## 2020-12-11 ASSESSMENT — ASTHMA QUESTIONNAIRES
QUESTION_1 LAST FOUR WEEKS HOW MUCH OF THE TIME DID YOUR ASTHMA KEEP YOU FROM GETTING AS MUCH DONE AT WORK, SCHOOL OR AT HOME: NONE OF THE TIME
QUESTION_3 LAST FOUR WEEKS HOW OFTEN DID YOUR ASTHMA SYMPTOMS (WHEEZING, COUGHING, SHORTNESS OF BREATH, CHEST TIGHTNESS OR PAIN) WAKE YOU UP AT NIGHT OR EARLIER THAN USUAL IN THE MORNING: NOT AT ALL
QUESTION_5 LAST FOUR WEEKS HOW WOULD YOU RATE YOUR ASTHMA CONTROL: WELL CONTROLLED
ACT_TOTALSCORE: 21
QUESTION_2 LAST FOUR WEEKS HOW OFTEN HAVE YOU HAD SHORTNESS OF BREATH: ONCE OR TWICE A WEEK
QUESTION_4 LAST FOUR WEEKS HOW OFTEN HAVE YOU USED YOUR RESCUE INHALER OR NEBULIZER MEDICATION (SUCH AS ALBUTEROL): TWO OR THREE TIMES PER WEEK

## 2020-12-11 ASSESSMENT — MIFFLIN-ST. JEOR: SCORE: 1816.02

## 2020-12-11 NOTE — PATIENT INSTRUCTIONS
Restart Montelukast (Singulair) 1 pill daily in the evening  Advair discus 1 puff 1-2 times daily. Rinse mouth out well afterwards    Spot check blood pressures. If >140/90, then increase Lisinopril to 10 mg daily and let us know    Preventive Health Recommendations  Male Ages 50 - 64    Yearly exam:             See your health care provider every year in order to  o   Review health changes.   o   Discuss preventive care.    o   Review your medicines if your doctor has prescribed any.     Have a cholesterol test every 5 years, or more frequently if you are at risk for high cholesterol/heart disease.     Have a diabetes test (fasting glucose) every three years. If you are at risk for diabetes, you should have this test more often.     Have a colonoscopy at age 50, or have a yearly FIT test (stool test). These exams will check for colon cancer.      Talk with your health care provider about whether or not a prostate cancer screening test (PSA) is right for you.    You should be tested each year for STDs (sexually transmitted diseases), if you re at risk.     Shots: Get a flu shot each year. Get a tetanus shot every 10 years.     Nutrition:    Eat at least 5 servings of fruits and vegetables daily.     Eat whole-grain bread, whole-wheat pasta and brown rice instead of white grains and rice.     Get adequate Calcium and Vitamin D.     Lifestyle    Exercise for at least 150 minutes a week (30 minutes a day, 5 days a week). This will help you control your weight and prevent disease.     Limit alcohol to one drink per day.     No smoking.     Wear sunscreen to prevent skin cancer.     See your dentist every six months for an exam and cleaning.   See your eye doctor every 1 to 2 years

## 2020-12-11 NOTE — PROGRESS NOTES
3  SUBJECTIVE:   CC: Bill Saunders is an 55 year old male who presents for preventive health visit.       Patient has been advised of split billing requirements and indicates understanding:   Healthy Habits:    Do you get at least three servings of calcium containing foods daily (dairy, green leafy vegetables, etc.)? yes    Amount of exercise or daily activities, outside of work: 7 day(s) per week    Problems taking medications regularly No    Medication side effects: No    Have you had an eye exam in the past two years? yes    Do you see a dentist twice per year? yes    Do you have sleep apnea, excessive snoring or daytime drowsiness?no    Hypertension - well controlled with Lisinopril 5 mg daily. Patient states it is sometimes in the 140's systolic though. Used to take 10 mg daily of Lisinopril. Denies chest pain, pressure, palpitations, shortness of breath, ankle swelling.  BP Readings from Last 3 Encounters:   12/11/20 124/78   02/07/19 140/82   11/05/18 142/88     Asthma - feels like he is short of breath and wheezing when he lays down. History of intermittent asthma. Uses Ventolin inhaler before bed and as needed. No ER visits for asthma.  ACT Total Scores 3/21/2012 10/25/2018 12/11/2020   ACT TOTAL SCORE 25 - -   ASTHMA ER VISITS 0 = None - -   ASTHMA HOSPITALIZATIONS 0 = None - -   ACT TOTAL SCORE (Goal Greater than or Equal to 20) - 23 21   In the past 12 months, how many times did you visit the emergency room for your asthma without being admitted to the hospital? - 0 0   In the past 12 months, how many times were you hospitalized overnight because of your asthma? - 0 0      All labs done recently through work - scanned into chart    EKG done at work showed right bundle branch block, which is not new for him. Denies chest pain, pressure, palpitations.    Pain bottom of right foot and noticed crack in skin. Wondering if there is splinter there.    Has colonoscopy scheduled to be done in next  week    Today's PHQ-2 Score:   PHQ-2 ( 1999 Pfizer) 12/11/2020 3/21/2012   Q1: Little interest or pleasure in doing things 0 0   Q2: Feeling down, depressed or hopeless 0 0   PHQ-2 Score 0 0       Abuse: Current or Past(Physical, Sexual or Emotional)- No  Do you feel safe in your environment? Yes    Have you ever done Advance Care Planning? (For example, a Health Directive, POLST, or a discussion with a medical provider or your loved ones about your wishes): No, advance care planning information given to patient to review.  Patient plans to discuss their wishes with loved ones or provider.      Social History     Tobacco Use     Smoking status: Never Smoker     Smokeless tobacco: Never Used   Substance Use Topics     Alcohol use: Yes     Alcohol/week: 5.0 standard drinks     Types: 6 Cans of beer per week     Comment: 7 per week     If you drink alcohol do you typically have >3 drinks per day or >7 drinks per week? No                      Last PSA:   PSA   Date Value Ref Range Status   04/06/2019 1.4 0 - 4.0 ng/mL Final       Reviewed orders with patient. Reviewed health maintenance and updated orders accordingly - Yes  BP Readings from Last 3 Encounters:   12/11/20 124/78   02/07/19 140/82   11/05/18 142/88    Wt Readings from Last 3 Encounters:   12/11/20 100.4 kg (221 lb 4 oz)   02/07/19 93 kg (205 lb)   11/05/18 93.9 kg (207 lb)                  Patient Active Problem List   Diagnosis     Essential hypertension     Family history of prostate cancer     Status post rotator cuff repair     Mild persistent asthma without complication     RBBB (right bundle branch block)     Past Surgical History:   Procedure Laterality Date     HC REPAIR OF NASAL SEPTUM  01/01/1982    repair fracture     HIP SURGERY Left 11/20/2018     HIP SURGERY Right 07/24/2018     REPAIR TENDON ACHILLES  05/01/2010    Left Achilles Tendon     ROTATOR CUFF REPAIR RT/LT Right 01/2016     SURGICAL HISTORY OF -   01/01/1998    extensor tendon  repair right hand       Social History     Tobacco Use     Smoking status: Never Smoker     Smokeless tobacco: Never Used   Substance Use Topics     Alcohol use: Yes     Alcohol/week: 5.0 standard drinks     Types: 6 Cans of beer per week     Comment: 7 per week     Family History   Problem Relation Age of Onset     Hypertension Mother      Diabetes Mother      C.A.D. Mother         By pass   of CHF AGE 75     Cancer Father         small cell Ca metastasized, BLADDER CA     Prostate Cancer Father 65         age 82     Melanoma Father      Depression Sister      Depression Brother      C.A.D. Maternal Uncle          Current Outpatient Medications   Medication Sig Dispense Refill     celecoxib (CELEBREX) 200 MG capsule TAKE ONE CAPSULE BY MOUTH ONCE DAILY  0     Cyanocobalamin (VITAMIN B-12 PO)        fluticasone-salmeterol (ADVAIR) 100-50 MCG/DOSE inhaler Inhale 1 puff into the lungs every 12 hours 1 Inhaler 11     Glucosamine HCl-MSM (GLUCOSAMINE-MSM) 375-250 MG TABS        lisinopril (PRINIVIL/ZESTRIL) 5 MG tablet TAKE 1 TABLET BY MOUTH DAILY 90 tablet 3     montelukast (SINGULAIR) 10 MG tablet Take 1 tablet (10 mg) by mouth daily 90 tablet 3     Multiple Vitamin (DAILY MULTIVITAMIN PO) Take  by mouth daily.       Omega-3 Fatty Acids (FISH OIL OMEGA-3 PO)        VENTOLIN  (90 Base) MCG/ACT inhaler INHALE 2 PUFFS BY MOUTH EVERY 6 HOURS AS NEEDED FOR WHEEZE OR FOR SHORTNESS OF BREATH 18 Inhaler 0     Allergies   Allergen Reactions     Other (Do Not Use) Anaphylaxis     PN: LW Other1: -seafood: anaphylatic reaction     Penicillins      PN: LW Reaction: Doesn't work       Reviewed and updated as needed this visit by clinical staff  Tobacco  Allergies  Meds  Problems  Med Hx  Surg Hx  Fam Hx          Reviewed and updated as needed this visit by Provider  Tobacco  Allergies  Meds  Problems  Med Hx  Surg Hx  Fam Hx             ROS:  CONSTITUTIONAL: NEGATIVE for fever, chills, change in  "weight  INTEGUMENTARY/SKIN: POSITIVE for fissure bottom of right foot  EYES: NEGATIVE for vision changes or irritation  ENT: NEGATIVE for ear, mouth and throat problems  RESP: NEGATIVE for significant cough or SOB  CV: NEGATIVE for chest pain, palpitations or peripheral edema  GI: NEGATIVE for nausea, abdominal pain, heartburn, or change in bowel habits   male: negative for dysuria, hematuria, decreased urinary stream, erectile dysfunction, urethral discharge  MUSCULOSKELETAL: NEGATIVE for significant arthralgias or myalgia  NEURO: NEGATIVE for weakness, dizziness or paresthesias  PSYCHIATRIC: NEGATIVE for changes in mood or affect    OBJECTIVE:   /78   Pulse 58   Temp 97.9  F (36.6  C)   Resp 16   Ht 1.74 m (5' 8.5\")   Wt 100.4 kg (221 lb 4 oz)   SpO2 97%   BMI 33.15 kg/m    EXAM:  GENERAL: healthy, alert and no distress  EYES: Eyes grossly normal to inspection, PERRL and conjunctivae and sclerae normal  HENT: ear canals and TM's normal, nose and mouth without ulcers or lesions  NECK: no adenopathy, no asymmetry, masses, or scars and thyroid normal to palpation  RESP: lungs clear to auscultation - no rales, rhonchi or wheezes  CV: regular rate and rhythm, normal S1 S2, no S3 or S4, no murmur, click or rub, no peripheral edema and peripheral pulses strong  ABDOMEN: soft, nontender, no hepatosplenomegaly, no masses and bowel sounds normal  MS: no gross musculoskeletal defects noted, no edema  SKIN: no suspicious lesions or rashes  NEURO: Normal strength and tone, mentation intact and speech normal  PSYCH: mentation appears normal, affect normal/bright    Diagnostic Test Results:  Labs reviewed in Epic    ASSESSMENT/PLAN:   Bill was seen today for physical.    Diagnoses and all orders for this visit:    Routine general medical examination at a health care facility  Healthy adult male up to date on health maintenance. Has colonoscopy scheduled. 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 Continue Lisinopril. Check home BP's and increase Lisinopril to 10 mg daily if > 140/90    Mild persistent asthma without complication  -     montelukast (SINGULAIR) 10 MG tablet; Take 1 tablet (10 mg) by mouth daily  -     fluticasone-salmeterol (ADVAIR) 100-50 MCG/DOSE inhaler; Inhale 1 puff into the lungs every 12 hours  Restart Singulair daily & Advair 1 puff 2 times daily    RBBB (right bundle branch block)    Fissure in skin of foot  Comments:  right foot  Moisturizer recommended    Patient has been advised of split billing requirements and indicates understanding: Yes  COUNSELING:  Reviewed preventive health counseling, as reflected in patient instructions  Special attention given to:        Regular exercise       Healthy diet/nutrition       Consider Hep C screening for all patients one time for ages 18-79 years       Colon cancer screening       Prostate cancer screening    Estimated body mass index is 33.15 kg/m  as calculated from the following:    Height as of this encounter: 1.74 m (5' 8.5\").    Weight as of this encounter: 100.4 kg (221 lb 4 oz).    Weight management plan: Discussed healthy diet and exercise guidelines    He reports that he has never smoked. He has never used smokeless tobacco.      Counseling Resources:  ATP IV Guidelines  Pooled Cohorts Equation Calculator  FRAX Risk Assessment  ICSI Preventive Guidelines  Dietary Guidelines for Americans, 2010  USDA's MyPlate  ASA Prophylaxis  Lung CA Screening    SHAVON Borrego CNP  [facility]  "

## 2020-12-12 ASSESSMENT — ASTHMA QUESTIONNAIRES: ACT_TOTALSCORE: 21

## 2020-12-23 ENCOUNTER — TRANSFERRED RECORDS (OUTPATIENT)
Dept: FAMILY MEDICINE | Facility: CLINIC | Age: 55
End: 2020-12-23

## 2021-01-13 DIAGNOSIS — I10 ESSENTIAL HYPERTENSION, BENIGN: ICD-10-CM

## 2021-01-13 NOTE — TELEPHONE ENCOUNTER
Lisinopril.  Last addressed 12/11/20.     BP Readings from Last 3 Encounters:   12/11/20 124/78   02/07/19 140/82   11/05/18 142/88         Hypertension - well controlled with Lisinopril 5 mg daily. Patient states it is sometimes in the 140's systolic though. Used to take 10 mg daily of Lisinopril. Denies chest pain, pressure, palpitations, shortness of breath, ankle swelling.

## 2021-01-14 RX ORDER — LISINOPRIL 5 MG/1
5 TABLET ORAL DAILY
Qty: 90 TABLET | Refills: 3 | Status: SHIPPED | OUTPATIENT
Start: 2021-01-14 | End: 2022-02-01

## 2021-09-04 ENCOUNTER — HEALTH MAINTENANCE LETTER (OUTPATIENT)
Age: 56
End: 2021-09-04

## 2021-11-28 ENCOUNTER — HOSPITAL ENCOUNTER (EMERGENCY)
Facility: CLINIC | Age: 56
Discharge: HOME | End: 2021-11-28
Attending: EMERGENCY MEDICINE | Admitting: EMERGENCY MEDICINE
Payer: COMMERCIAL

## 2021-11-28 ENCOUNTER — APPOINTMENT (OUTPATIENT)
Dept: CT IMAGING | Facility: CLINIC | Age: 56
End: 2021-11-28
Attending: EMERGENCY MEDICINE
Payer: COMMERCIAL

## 2021-11-28 VITALS
DIASTOLIC BLOOD PRESSURE: 92 MMHG | TEMPERATURE: 97.4 F | RESPIRATION RATE: 16 BRPM | SYSTOLIC BLOOD PRESSURE: 144 MMHG | HEART RATE: 63 BPM | WEIGHT: 210 LBS | OXYGEN SATURATION: 98 % | HEIGHT: 70 IN | BODY MASS INDEX: 30.06 KG/M2

## 2021-11-28 DIAGNOSIS — R11.2 NON-INTRACTABLE VOMITING WITH NAUSEA, UNSPECIFIED VOMITING TYPE: ICD-10-CM

## 2021-11-28 DIAGNOSIS — R51.9 NEW ONSET HEADACHE: ICD-10-CM

## 2021-11-28 DIAGNOSIS — R10.84 ABDOMINAL PAIN, GENERALIZED: ICD-10-CM

## 2021-11-28 LAB
ALBUMIN SERPL-MCNC: 3.5 G/DL (ref 3.4–5)
ALBUMIN UR-MCNC: NEGATIVE MG/DL
ALP SERPL-CCNC: 100 U/L (ref 40–150)
ALT SERPL W P-5'-P-CCNC: 47 U/L (ref 0–70)
ANION GAP SERPL CALCULATED.3IONS-SCNC: 6 MMOL/L (ref 3–14)
APPEARANCE UR: CLEAR
AST SERPL W P-5'-P-CCNC: 22 U/L (ref 0–45)
BASOPHILS # BLD AUTO: 0.1 10E3/UL (ref 0–0.2)
BASOPHILS NFR BLD AUTO: 0 %
BILIRUB SERPL-MCNC: 0.3 MG/DL (ref 0.2–1.3)
BILIRUB UR QL STRIP: NEGATIVE
BUN SERPL-MCNC: 12 MG/DL (ref 7–30)
CALCIUM SERPL-MCNC: 8.9 MG/DL (ref 8.5–10.1)
CHLORIDE BLD-SCNC: 107 MMOL/L (ref 94–109)
CO2 SERPL-SCNC: 25 MMOL/L (ref 20–32)
COLOR UR AUTO: NORMAL
CREAT SERPL-MCNC: 0.73 MG/DL (ref 0.66–1.25)
EOSINOPHIL # BLD AUTO: 0.1 10E3/UL (ref 0–0.7)
EOSINOPHIL NFR BLD AUTO: 1 %
ERYTHROCYTE [DISTWIDTH] IN BLOOD BY AUTOMATED COUNT: 12.7 % (ref 10–15)
FLUAV RNA SPEC QL NAA+PROBE: NEGATIVE
FLUBV RNA RESP QL NAA+PROBE: NEGATIVE
GFR SERPL CREATININE-BSD FRML MDRD: >90 ML/MIN/1.73M2
GLUCOSE BLD-MCNC: 115 MG/DL (ref 70–99)
GLUCOSE UR STRIP-MCNC: NEGATIVE MG/DL
HCT VFR BLD AUTO: 47 % (ref 40–53)
HGB BLD-MCNC: 15.8 G/DL (ref 13.3–17.7)
HGB UR QL STRIP: NEGATIVE
HOLD SPECIMEN: NORMAL
IMM GRANULOCYTES # BLD: 0.1 10E3/UL
IMM GRANULOCYTES NFR BLD: 0 %
KETONES UR STRIP-MCNC: NEGATIVE MG/DL
LEUKOCYTE ESTERASE UR QL STRIP: NEGATIVE
LIPASE SERPL-CCNC: 72 U/L (ref 73–393)
LYMPHOCYTES # BLD AUTO: 1.3 10E3/UL (ref 0.8–5.3)
LYMPHOCYTES NFR BLD AUTO: 10 %
MCH RBC QN AUTO: 28.7 PG (ref 26.5–33)
MCHC RBC AUTO-ENTMCNC: 33.6 G/DL (ref 31.5–36.5)
MCV RBC AUTO: 86 FL (ref 78–100)
MONOCYTES # BLD AUTO: 0.8 10E3/UL (ref 0–1.3)
MONOCYTES NFR BLD AUTO: 6 %
NEUTROPHILS # BLD AUTO: 10.8 10E3/UL (ref 1.6–8.3)
NEUTROPHILS NFR BLD AUTO: 83 %
NITRATE UR QL: NEGATIVE
NRBC # BLD AUTO: 0 10E3/UL
NRBC BLD AUTO-RTO: 0 /100
PH UR STRIP: 7 [PH] (ref 5–7)
PLATELET # BLD AUTO: 297 10E3/UL (ref 150–450)
POTASSIUM BLD-SCNC: 4 MMOL/L (ref 3.4–5.3)
PROT SERPL-MCNC: 7.4 G/DL (ref 6.8–8.8)
RBC # BLD AUTO: 5.5 10E6/UL (ref 4.4–5.9)
RBC URINE: 0 /HPF
SARS-COV-2 RNA RESP QL NAA+PROBE: NEGATIVE
SODIUM SERPL-SCNC: 138 MMOL/L (ref 133–144)
SP GR UR STRIP: 1.03 (ref 1–1.03)
UROBILINOGEN UR STRIP-MCNC: NORMAL MG/DL
WBC # BLD AUTO: 13.1 10E3/UL (ref 4–11)
WBC URINE: 0 /HPF

## 2021-11-28 PROCEDURE — 74177 CT ABD & PELVIS W/CONTRAST: CPT

## 2021-11-28 PROCEDURE — 250N000009 HC RX 250: Performed by: EMERGENCY MEDICINE

## 2021-11-28 PROCEDURE — 36415 COLL VENOUS BLD VENIPUNCTURE: CPT | Performed by: EMERGENCY MEDICINE

## 2021-11-28 PROCEDURE — 83690 ASSAY OF LIPASE: CPT | Performed by: EMERGENCY MEDICINE

## 2021-11-28 PROCEDURE — 81001 URINALYSIS AUTO W/SCOPE: CPT | Performed by: EMERGENCY MEDICINE

## 2021-11-28 PROCEDURE — 82040 ASSAY OF SERUM ALBUMIN: CPT | Performed by: EMERGENCY MEDICINE

## 2021-11-28 PROCEDURE — 250N000013 HC RX MED GY IP 250 OP 250 PS 637: Performed by: EMERGENCY MEDICINE

## 2021-11-28 PROCEDURE — 96375 TX/PRO/DX INJ NEW DRUG ADDON: CPT

## 2021-11-28 PROCEDURE — 85025 COMPLETE CBC W/AUTO DIFF WBC: CPT | Performed by: EMERGENCY MEDICINE

## 2021-11-28 PROCEDURE — 96376 TX/PRO/DX INJ SAME DRUG ADON: CPT

## 2021-11-28 PROCEDURE — 70450 CT HEAD/BRAIN W/O DYE: CPT

## 2021-11-28 PROCEDURE — 250N000011 HC RX IP 250 OP 636: Performed by: EMERGENCY MEDICINE

## 2021-11-28 PROCEDURE — 258N000003 HC RX IP 258 OP 636: Performed by: EMERGENCY MEDICINE

## 2021-11-28 PROCEDURE — C9803 HOPD COVID-19 SPEC COLLECT: HCPCS

## 2021-11-28 PROCEDURE — 96374 THER/PROPH/DIAG INJ IV PUSH: CPT | Mod: 59

## 2021-11-28 PROCEDURE — 87636 SARSCOV2 & INF A&B AMP PRB: CPT | Performed by: EMERGENCY MEDICINE

## 2021-11-28 PROCEDURE — 99285 EMERGENCY DEPT VISIT HI MDM: CPT | Mod: 25

## 2021-11-28 PROCEDURE — 96361 HYDRATE IV INFUSION ADD-ON: CPT

## 2021-11-28 RX ORDER — ONDANSETRON 4 MG/1
4-8 TABLET, ORALLY DISINTEGRATING ORAL EVERY 8 HOURS PRN
Qty: 10 TABLET | Refills: 0 | Status: SHIPPED | OUTPATIENT
Start: 2021-11-28 | End: 2021-12-01

## 2021-11-28 RX ORDER — METOCLOPRAMIDE HYDROCHLORIDE 5 MG/ML
10 INJECTION INTRAMUSCULAR; INTRAVENOUS ONCE
Status: COMPLETED | OUTPATIENT
Start: 2021-11-28 | End: 2021-11-28

## 2021-11-28 RX ORDER — IOPAMIDOL 755 MG/ML
105 INJECTION, SOLUTION INTRAVASCULAR ONCE
Status: COMPLETED | OUTPATIENT
Start: 2021-11-28 | End: 2021-11-28

## 2021-11-28 RX ORDER — HYDROMORPHONE HYDROCHLORIDE 1 MG/ML
0.5 INJECTION, SOLUTION INTRAMUSCULAR; INTRAVENOUS; SUBCUTANEOUS ONCE
Status: COMPLETED | OUTPATIENT
Start: 2021-11-28 | End: 2021-11-28

## 2021-11-28 RX ORDER — ONDANSETRON 2 MG/ML
4 INJECTION INTRAMUSCULAR; INTRAVENOUS ONCE
Status: COMPLETED | OUTPATIENT
Start: 2021-11-28 | End: 2021-11-28

## 2021-11-28 RX ORDER — SUCRALFATE 1 G/1
1 TABLET ORAL 4 TIMES DAILY PRN
Qty: 20 TABLET | Refills: 0 | Status: SHIPPED | OUTPATIENT
Start: 2021-11-28 | End: 2023-08-04

## 2021-11-28 RX ORDER — KETOROLAC TROMETHAMINE 15 MG/ML
15 INJECTION, SOLUTION INTRAMUSCULAR; INTRAVENOUS ONCE
Status: COMPLETED | OUTPATIENT
Start: 2021-11-28 | End: 2021-11-28

## 2021-11-28 RX ORDER — METOCLOPRAMIDE 10 MG/1
10 TABLET ORAL 4 TIMES DAILY PRN
Qty: 10 TABLET | Refills: 0 | Status: SHIPPED | OUTPATIENT
Start: 2021-11-28 | End: 2023-08-04

## 2021-11-28 RX ADMIN — HYDROMORPHONE HYDROCHLORIDE 0.5 MG: 1 INJECTION, SOLUTION INTRAMUSCULAR; INTRAVENOUS; SUBCUTANEOUS at 21:56

## 2021-11-28 RX ADMIN — METOCLOPRAMIDE 10 MG: 5 INJECTION, SOLUTION INTRAMUSCULAR; INTRAVENOUS at 19:33

## 2021-11-28 RX ADMIN — IOPAMIDOL 105 ML: 755 INJECTION, SOLUTION INTRAVENOUS at 20:07

## 2021-11-28 RX ADMIN — SODIUM CHLORIDE 70 ML: 9 INJECTION, SOLUTION INTRAVENOUS at 20:07

## 2021-11-28 RX ADMIN — ONDANSETRON 4 MG: 2 INJECTION INTRAMUSCULAR; INTRAVENOUS at 21:53

## 2021-11-28 RX ADMIN — HYDROMORPHONE HYDROCHLORIDE 0.5 MG: 1 INJECTION, SOLUTION INTRAMUSCULAR; INTRAVENOUS; SUBCUTANEOUS at 19:36

## 2021-11-28 RX ADMIN — LIDOCAINE HYDROCHLORIDE 30 ML: 20 SOLUTION ORAL; TOPICAL at 21:19

## 2021-11-28 RX ADMIN — SODIUM CHLORIDE 1000 ML: 9 INJECTION, SOLUTION INTRAVENOUS at 19:33

## 2021-11-28 RX ADMIN — KETOROLAC TROMETHAMINE 15 MG: 15 INJECTION, SOLUTION INTRAMUSCULAR; INTRAVENOUS at 21:21

## 2021-11-28 ASSESSMENT — ENCOUNTER SYMPTOMS
VOMITING: 1
ABDOMINAL PAIN: 1
DYSURIA: 0
HEADACHES: 1
HEMATURIA: 0
FREQUENCY: 1

## 2021-11-28 ASSESSMENT — MIFFLIN-ST. JEOR: SCORE: 1783.8

## 2021-11-28 NOTE — LETTER
[facility] EMERGENCY DEPT  [address] [place] [address]  [phone]      2021    Bill Saunders  [address]  [place] [address]  [phone] (home) [phone] (work)    : 1965      To Whom it may concern:    Bill Saunders was seen in our Emergency Department today, 2021.  Based on his evaluation he will not be at work tomorrow.  Return to work depends on symptom improvement anticipated within 3-5 days.    Sincerely,      Joanne Pop MD

## 2021-11-29 NOTE — ED PROVIDER NOTES
"  History   Chief Complaint:  Abdominal Pain       HPI   Bill Saunders is a 56 year old male who presents with upper abdominal pain that started last night after dinner and a headache that started this morning. He has vomited 3 times today. His headache had worsened this morning and got better. He has never had a headache this bad before though he notes it is better than earlier today. The pain seems to be near his ears. He had taken 2 Advils but it did not help. He denies speech changes or vision changes and he is able to move his neck, arms, and legs normally. He denies dysuria or hematuria. He has been having more frequent urinations but he has been drinking more fluids.       Review of Systems   Gastrointestinal: Positive for abdominal pain and vomiting.   Genitourinary: Positive for frequency. Negative for dysuria and hematuria.   Neurological: Positive for headaches.   All other systems reviewed and are negative.    Allergies:  Penicillins    Medications:  Celebrex  Advair  Glucosamine  Zestril  Singulair    Past Medical History:     Hypertension  Asthma  RBBB  Polyp of colon  Diverticular disease of large intestine    Past Surgical History:    Repair of nasal septum  Hip surgery  Repair tendon achilles  Rotator cuff repair  Extensor tendon repair right hawkins    Family History:    Hypertension  DM  CAD  Cancer  Depression    Social History:  The patient presents with wide    Physical Exam     Patient Vitals for the past 24 hrs:   BP Temp Temp src Pulse Resp SpO2 Height Weight   11/28/21 2200 (!) 144/92 -- -- 63 -- -- -- --   11/28/21 2130 (!) 147/98 -- -- 104 -- -- -- --   11/28/21 2100 (!) 171/107 -- -- 67 -- -- -- --   11/28/21 2049 -- -- -- -- -- 98 % -- --   11/28/21 2047 (!) 173/109 -- -- 64 -- 99 % -- --   11/28/21 1838 (!) 184/111 97.4  F (36.3  C) Temporal 68 16 97 % 1.778 m (5' 10\") 95.3 kg (210 lb)       Physical Exam  Eyes:               Sclera white; Pupils are equal and round  ENT:                " External ears and nares normal  CV:                  Rate as above with regular rhythm   Resp:               Breath sounds clear and equal bilaterally                          Non-labored, no retractions or accessory muscle use  GI:                   Abdomen is soft, epigastric tenderness, negative Owens's, LLQ tenderness                          No rebound tenderness or peritoneal features  MS:                  Moves all extremities  Skin:                Warm and dry  Neuro:             Speech is normal and fluent. No apparent deficit.    Emergency Department Course     Imaging:  CT Abdomen Pelvis w Contrast   Final Result   IMPRESSION:    1.  No acute abnormality demonstrated in the abdomen or pelvis.      CT Head w/o Contrast   Final Result   IMPRESSION:   1.  No acute intracranial hemorrhage, mass effect, or CT evidence for acute infarction.   2.  Presumed sequelae of mild chronic small vessel ischemic disease.        Report per radiology    Laboratory:  Labs Ordered and Resulted from Time of ED Arrival to Time of ED Departure   COMPREHENSIVE METABOLIC PANEL - Abnormal       Result Value    Sodium 138      Potassium 4.0      Chloride 107      Carbon Dioxide (CO2) 25      Anion Gap 6      Urea Nitrogen 12      Creatinine 0.73      Calcium 8.9      Glucose 115 (*)     Alkaline Phosphatase 100      AST 22      ALT 47      Protein Total 7.4      Albumin 3.5      Bilirubin Total 0.3      GFR Estimate >90     LIPASE - Abnormal    Lipase 72 (*)    CBC WITH PLATELETS AND DIFFERENTIAL - Abnormal    WBC Count 13.1 (*)     RBC Count 5.50      Hemoglobin 15.8      Hematocrit 47.0      MCV 86      MCH 28.7      MCHC 33.6      RDW 12.7      Platelet Count 297      % Neutrophils 83      % Lymphocytes 10      % Monocytes 6      % Eosinophils 1      % Basophils 0      % Immature Granulocytes 0      NRBCs per 100 WBC 0      Absolute Neutrophils 10.8 (*)     Absolute Lymphocytes 1.3      Absolute Monocytes 0.8      Absolute  Eosinophils 0.1      Absolute Basophils 0.1      Absolute Immature Granulocytes 0.1 (*)     Absolute NRBCs 0.0     ROUTINE UA WITH MICROSCOPIC - Normal    Color Urine Straw      Appearance Urine Clear      Glucose Urine Negative      Bilirubin Urine Negative      Ketones Urine Negative      Specific Gravity Urine 1.030      Blood Urine Negative      pH Urine 7.0      Protein Albumin Urine Negative      Urobilinogen Urine Normal      Nitrite Urine Negative      Leukocyte Esterase Urine Negative      RBC Urine 0      WBC Urine 0     INFLUENZA A/B & SARS-COV2 PCR MULTIPLEX - Normal    Influenza A PCR Negative      Influenza B PCR Negative      SARS CoV2 PCR Negative         Emergency Department Course:  Reviewed:  I reviewed nursing notes, vitals, past medical history and Care Everywhere    Assessments:  1652 I obtained history and examined the patient as noted above.     2212 I rechecked the patient and explained findings.     Interventions:  1933 NaCl Bolus 1,000 mL IV    1933 Reglan 10 mg IV    1936 Dilaudid 0,5 mg IV    2007 Saline 70 mL     2121 Toradol 15 mg IV    2119 Xylocaine and Maalox cocktail 30 mL PO    2153 Zofran 4 mg IV    2156 Dilaudid 0.5 mg IV    Disposition:  The patient was discharged to home.     Impression & Plan     Medical Decision Making:  Bill Saunders is a 56 year old male is here fore evaluation of headache, abdominal pain, and dry heaving. Abdominal exam raises concern for gastritis, peptic ulcer disease, biliary pathology, hepatitis, pancreatitis, and diverticulitis. Blood work did not support these conditions nor did CT scanning. On repeat exam he has negative Owens sign and I do not suspect biliary pathology that was not seen on CT and ultrasound is not indicated. CT of the head for new headaches was negative for intracranial bleeding, mass, or swelling. At this point exact etiology of symptoms is unclear but there do not seem to be any bacterial or surgical etiologies.  This is not a  classic presentation for Covid-19 or influenza but I discussed viral testing with the patient due to how common COVID is right now. He was interested in having this performed.  Returned negative.  Symptomatic medications were prescribed.      Diagnosis:    ICD-10-CM    1. Abdominal pain, generalized  R10.84    2. Non-intractable vomiting with nausea, unspecified vomiting type  R11.2    3. New onset headache  R51.9        Discharge Medications:  New Prescriptions    METOCLOPRAMIDE (REGLAN) 10 MG TABLET    Take 1 tablet (10 mg) by mouth 4 times daily as needed (nausea (20 min before eating), headache)    ONDANSETRON (ZOFRAN ODT) 4 MG ODT TAB    Take 1-2 tablets (4-8 mg) by mouth every 8 hours as needed for nausea or vomiting    SUCRALFATE (CARAFATE) 1 GM TABLET    Take 1 tablet (1 g) by mouth 4 times daily as needed (upper abdominal pain)       Scribe Disclosure:  I, Vadim Syed, am serving as a scribe at 6:45 PM on 11/28/2021 to document services personally performed by Joanne Pop MD based on my observations and the provider's statements to me.            Joanne Pop MD  11/28/21 7030

## 2021-11-29 NOTE — DISCHARGE INSTRUCTIONS
Discharge Instructions  Abdominal Pain    Abdominal pain (belly pain) can be caused by many things. Your evaluation today does not show the exact cause for your pain. Your provider today has decided that it is unlikely your pain is due to a life threatening problem, or a problem requiring surgery or hospital admission. Sometimes those problems cannot be found right away, so it is very important that you follow up as directed.  Sometimes only the changes which occur over time allow the cause of your pain to be found.    Generally, every Emergency Department visit should have a follow-up clinic visit with either a primary or a specialty clinic/provider. Please follow-up as instructed by your emergency provider today. With abdominal pain, we often recommend very close follow-up, such as the following day.    ADULTS:  Return to the Emergency Department right away if:    You get an oral temperature above 102oF or as directed by your provider.  You have blood in your stools. This may be bright red or appear as black, tarry stools.    You keep vomiting (throwing up) or cannot drink liquids.  You see blood when you vomit.   You cannot have a bowel movement or you cannot pass gas.  Your stomach gets bloated or bigger.  Your skin or the whites of your eyes look yellow.  You faint.  You have bloody, frequent or painful urination (peeing).  You have new symptoms or anything that worries you.    CHILDREN:  Return to the Emergency Department right away if your child has any of the above-listed symptoms or the following:    Pushes your hand away or screams/cries when his/her belly is touched.  You notice your child is very fussy or weak.  Your child is very tired and is too tired to eat or drink.  Your child is dehydrated.  Signs of dehydration can be:  Significant change in the amount of wet diapers/urine.  Your infant or child starts to have dry mouth and lips, or no saliva (spit) or tears.    PREGNANT WOMEN:  Return to the  Emergency Department right away if you have any of the above-listed symptoms or the following:    You have bleeding, leaking fluid or passing tissue from the vagina.  You have worse pain or cramping, or pain in your shoulder or back.  You have vomiting that will not stop.  You have a temperature of 100oF or more.  Your baby is not moving as much as usual.  You faint.  You get a bad headache with or without eye problems and abdominal pain.  You have a seizure.  You have unusual discharge from your vagina and abdominal pain.    Abdominal pain is pretty common during pregnancy.  Your pain may or may not be related to your pregnancy. You should follow-up closely with your OB provider so they can evaluate you and your baby.  Until you follow-up with your regular provider, do the following:     Avoid sex and do not put anything in your vagina.  Drink clear fluids.  Only take medications approved by your provider.    MORE INFORMATION:    Appendicitis:  A possible cause of abdominal pain in any person who still has their appendix is acute appendicitis. Appendicitis is often hard to diagnose.  Testing does not always rule out early appendicitis or other causes of abdominal pain. Close follow-up with your provider and re-evaluations may be needed to figure out the reason for your abdominal pain.    Follow-up:  It is very important that you make an appointment with your clinic and go to the appointment.  If you do not follow-up with your primary provider, it may result in missing an important development which could result in permanent injury or disability and/or lasting pain.  If there is any problem keeping your appointment, call your provider or return to the Emergency Department.    Medications:  Take your medications as directed by your provider today.  Before using over-the-counter medications, ask your provider and make sure to take the medications as directed.  If you have any questions about medications, ask your  "provider.    Diet:  Resume your normal diet as much as possible, but do not eat fried, fatty or spicy foods while you have pain.  Do not drink alcohol or have caffeine.  Do not smoke tobacco.    Probiotics: If you have been given an antibiotic, you may want to also take a probiotic pill or eat yogurt with live cultures. Probiotics have \"good bacteria\" to help your intestines stay healthy. Studies have shown that probiotics help prevent diarrhea (loose stools) and other intestine problems (including C. diff infection) when you take antibiotics. You can buy these without a prescription in the pharmacy section of the store.     If you were given a prescription for medicine here today, be sure to read all of the information (including the package insert) that comes with your prescription.  This will include important information about the medicine, its side effects, and any warnings that you need to know about.  The pharmacist who fills the prescription can provide more information and answer questions you may have about the medicine.  If you have questions or concerns that the pharmacist cannot address, please call or return to the Emergency Department.       Remember that you can always come back to the Emergency Department if you are not able to see your regular provider in the amount of time listed above, if you get any new symptoms, or if there is anything that worries you.      IF YOUR COVID TEST IS POSITIVE:    Discharge Instructions  COVID-19    COVID-19 is the disease caused by a new coronavirus. The virus spreads from person-to-person primarily by droplets when an infected person coughs or sneezes and the droplets are then breathed in by another person. There are tests available to diagnose COVID-19. You may have been diagnosed with COVID, may be being tested for COVID and have a pending test result, or may have been exposed to COVID.    Symptoms of COVID-19  Many people have no symptoms or mild symptoms.  " Symptoms may usually appear 4 to 5 days (up to 14 days) after contact with a person with COVID-19. Some people will get severe symptoms and pneumonia. Usual symptoms are:     ? Fever  ? Cough  ? Trouble breathing    Less common symptoms are: Headache, body aches, sore throat, sneezing, diarrhea, loss of taste or smell.    Isolation and Quarantine    You may have been seen because you have symptoms, had an exposure, or had some other concern about possible COVID. The best way to stop the spread of the virus is to avoid contact with others.    Isolation refers to sick people staying away from people who are not sick. A person in quarantine is limiting activity because they were exposed and are waiting to see if they might become sick.    If you test positive for COVID, you should stay home (isolation) for at least 10 days after your symptoms began, and for 24 hours with no fever and improvement of symptoms--whichever is longer. (Your fever should be gone for 24 hours without using fever-reducing medicine). If you have no symptoms, you should stay home (isolation) for 10 days from the day of the test. If you have been vaccinated for COVID, the vaccination will not cause you to test positive so a positive test result generally is a  true positive .    For example, if you have a fever and cough for 6 days, you need to stay home 4 more days with no fever for a total of 10 days. Or, if you have a fever and cough for 10 days, you need to stay home one more day with no fever for a total of 11 days.    If you have a high-risk exposure to COVID (you spent 15 minutes or more within six feet of somebody who has COVID), you should stay home (quarantine) for 14 days, unless you are vaccinated. Even if you test negative for COVID, the CDC recommends a 14-day quarantine from the time of your last exposure to that individual (unless you are vaccinated). There are options for a shortened (<14 day quarantine) you can review  at:  https://[email or web address].[facility].[place]/diseases/coronavirus/close.html#long    If you live in the same house as somebody with COVID and cannot separate from them, you will need to quarantine for 14-days after that person's isolation (infectious) period. That means that you may need to quarantine for 24-days after that person became symptomatic/ill.    If you are vaccinated and do not develop symptoms, you do not need to quarantine after exposure.    If you have symptoms but a negative test, you should stay at home until you are symptom-free and without fever for 24 hours, using the same judgment you would for when it is safe to return to work/school from strep throat, influenza, or the common cold. If you worsen, you should consider being re-evaluated.    If you are being tested for COVID because of symptoms and your test is pending, you should stay home until you know your test result.    If I have COVID, how should I protect myself and others?    Do not go to work or school. Have a friend or relative do your shopping. Do not use public transportation (bus, train) or ridesharing ([employer], Uber).    Separate yourself from other people in your home. As much as possible, you should stay in one room and away from other people in your home. Also, use a separate bathroom, if possible. Avoid handling pets or other animals while sick.     Wear a facemask if you need to be around other people and cover your mouth and nose with a tissue when you cough or sneeze.     Avoid sharing personal household items. You should not share dishes, drinking glasses, forks/knives/spoons, towels, or bedding with other people in your home. After using these items, they should be washed with soap and water. Clean parts of your home that are touched often (doorknobs, faucets, countertops, etc.) daily.     Wash your hands often with soap and water for at least 20 seconds or use an alcohol-based hand  containing at least 60% alcohol.     Avoid  touching your face.    Treat your symptoms. You can take Acetaminophen (Tylenol) to treat body aches and fever as needed for comfort. Ibuprofen (Advil or Motrin) can be used as well if you still have symptoms after taking Tylenol. Drink fluids. Rest.    Watch for worsening symptoms such as shortness of breath/difficulty breathing or very severe weakness.    Employers/workplaces are being asked by the Centers for Disease Control (CDC) to not request notes/documentation for you to return to work or prove that you were ill. You may choose to show your employer this paperwork. Also, repeat testing should not be required to return to work.    Exercise/Sports in rare cases, COVID could affect your heart in a way that makes exercise or participation in sports dangerous.    If you have a mild COVID illness (fever, cough, sore throat, and similar symptoms but no difficulty breathing or abnormalities of the lung): After your COVID symptoms have resolved, wait 14-days before returning to activity.  If you have more than a mild illness (meaning that you have problems with your breathing or lungs) or if you participate in competitive or strenuous activity or have a history of heart disease: Please see your primary doctor/provider prior to return to activity/competition.    Antibody treatments are available for patients with mild to moderate COVID illness in order to prevent severe illness. In general, only patients with risk factors for severe illness are eligible for treatment. For more information, to see if you are eligible, and to find treatment, go to the [facility]:  [email or web address]/diseases/coronavirus/mnrap.html     Return to the Emergency Department if:    If you are developing worsening breathing, shortness of breath, or feel worse you should seek medical attention.  If you are uncertain, contact your health care provider/clinic. If you need emergency medical attention, call 911 and  tell them you have been ill.

## 2021-11-30 ENCOUNTER — OFFICE VISIT (OUTPATIENT)
Dept: FAMILY MEDICINE | Facility: CLINIC | Age: 56
End: 2021-11-30

## 2021-11-30 VITALS
BODY MASS INDEX: 30.85 KG/M2 | SYSTOLIC BLOOD PRESSURE: 143 MMHG | HEART RATE: 68 BPM | OXYGEN SATURATION: 97 % | WEIGHT: 215 LBS | DIASTOLIC BLOOD PRESSURE: 88 MMHG

## 2021-11-30 DIAGNOSIS — Z23 ENCOUNTER FOR IMMUNIZATION: ICD-10-CM

## 2021-11-30 DIAGNOSIS — K29.00 ACUTE GASTRITIS, PRESENCE OF BLEEDING UNSPECIFIED, UNSPECIFIED GASTRITIS TYPE: Primary | ICD-10-CM

## 2021-11-30 LAB
% GRANULOCYTES: 80.5 % (ref 42.2–75.2)
HCT VFR BLD AUTO: 49.9 % (ref 39–51)
HEMOGLOBIN: 16.3 G/DL (ref 13.4–17.5)
LYMPHOCYTES NFR BLD AUTO: 14.6 % (ref 20.5–51.1)
MCH RBC QN AUTO: 28.7 PG (ref 27–31)
MCHC RBC AUTO-ENTMCNC: 32.6 G/DL (ref 33–37)
MCV RBC AUTO: 87.9 FL (ref 80–100)
MONOCYTES NFR BLD AUTO: 4.9 % (ref 1.7–9.3)
PLATELET # BLD AUTO: 309 K/UL (ref 140–450)
RBC # BLD AUTO: 5.68 X10/CMM (ref 4.2–5.9)
WBC # BLD AUTO: 11.2 X10/CMM (ref 3.8–11)

## 2021-11-30 PROCEDURE — 85025 COMPLETE CBC W/AUTO DIFF WBC: CPT | Performed by: FAMILY MEDICINE

## 2021-11-30 PROCEDURE — 99214 OFFICE O/P EST MOD 30 MIN: CPT | Mod: 25 | Performed by: FAMILY MEDICINE

## 2021-11-30 PROCEDURE — 90471 IMMUNIZATION ADMIN: CPT | Mod: 59 | Performed by: FAMILY MEDICINE

## 2021-11-30 PROCEDURE — 90686 IIV4 VACC NO PRSV 0.5 ML IM: CPT | Performed by: FAMILY MEDICINE

## 2021-11-30 PROCEDURE — 36415 COLL VENOUS BLD VENIPUNCTURE: CPT | Performed by: FAMILY MEDICINE

## 2021-11-30 RX ORDER — OMEPRAZOLE 20 MG/1
20 TABLET, DELAYED RELEASE ORAL 2 TIMES DAILY
Qty: 30 TABLET | Refills: 1 | Status: SHIPPED | OUTPATIENT
Start: 2021-11-30 | End: 2021-12-07

## 2021-11-30 ASSESSMENT — ANXIETY QUESTIONNAIRES
GAD7 TOTAL SCORE: 0
2. NOT BEING ABLE TO STOP OR CONTROL WORRYING: NOT AT ALL
6. BECOMING EASILY ANNOYED OR IRRITABLE: NOT AT ALL
3. WORRYING TOO MUCH ABOUT DIFFERENT THINGS: NOT AT ALL
5. BEING SO RESTLESS THAT IT IS HARD TO SIT STILL: NOT AT ALL
IF YOU CHECKED OFF ANY PROBLEMS ON THIS QUESTIONNAIRE, HOW DIFFICULT HAVE THESE PROBLEMS MADE IT FOR YOU TO DO YOUR WORK, TAKE CARE OF THINGS AT HOME, OR GET ALONG WITH OTHER PEOPLE: NOT DIFFICULT AT ALL
7. FEELING AFRAID AS IF SOMETHING AWFUL MIGHT HAPPEN: NOT AT ALL
1. FEELING NERVOUS, ANXIOUS, OR ON EDGE: NOT AT ALL

## 2021-11-30 ASSESSMENT — PATIENT HEALTH QUESTIONNAIRE - PHQ9: 5. POOR APPETITE OR OVEREATING: NOT AT ALL

## 2021-11-30 NOTE — PROGRESS NOTES
Ronak Khan is a 56 year old patient who presents to clinic for evaluation.  He was seen in the ED 11/28 for abd pain and headache that started that morning.  It was severe.  CT was essentially unremarkable.  Mild leukocytosis with left shift.  He was discharged with carafate, zofran and is feeling about the same.  Severe pain is mildly improved but still persists.  It is mostly upper mid abdomen.  No fever, chills, hematochezia, melena.    Reports taking about 4 ibuprofen per day for aches and pain.  Drinks 3 cups of coffee and about 2 beers per day on average.  Eating exacerbates symptoms.    Review of Systems   Constitutional, HEENT, cardiovascular, pulmonary, GI, , musculoskeletal, neuro, skin, endocrine and psych systems are negative, except as otherwise noted.      Objective    BP (!) 143/88   Pulse 68   Wt 97.5 kg (215 lb)   SpO2 97%   BMI 30.85 kg/m      General: Well appearing, NAD  Abd: soft, nondistended.  Focal epigastric pain without rebound.  No mass  Psych: normal mood and affect        Results for orders placed or performed in visit on 11/30/21 (from the past 24 hour(s))   CBC with Diff/Plt ([facility])   Result Value Ref Range    WBC x10/cmm 11.2 (A) 3.8 - 11.0 x10/cmm    % Lymphocytes 14.6 (A) 20.5 - 51.1 %    % Monocytes 4.9 1.7 - 9.3 %    % Granulocytes 80.5 (A) 42.2 - 75.2 %    RBC x10/cmm 5.68 4.2 - 5.9 x10/cmm    Hemoglobin 16.3 13.4 - 17.5 g/dl    Hematocrit 49.9 39 - 51 %    MCV 87.9 80 - 100 fL    MCH 28.7 27.0 - 31.0 pg    MCHC 32.6 (A) 33.0 - 37.0 g/dL    Platelet Count 309 140 - 450 K/uL       Assessment & Plan     Acute gastritis, presence of bleeding unspecified, unspecified gastritis type  History and exam, along with normal CT, suspicious for gastritis with possible PUD.  No acute anemia or alarm symptoms.   - start PPI BID  - dietary changes discussed in detail  - no NSAIDs, EtOH until improved  - CBC with Diff/Plt ([facility])  - GI referral for likely endoscopy  - omeprazole  "(PRILOSEC OTC) 20 MG EC tablet; Take 1 tablet (20 mg) by mouth 2 times daily 30-60 minutes before breakfast  - Adult Gastro Ref - Consult Only; Future  - VENOUS COLLECTION    Encounter for immunization  - VACCINE ADMINISTRATION, INITIAL  [id number]}     BMI:   Estimated body mass index is 30.85 kg/m  as calculated from the following:    Height as of 11/28/21: 1.778 m (5' 10\").    Weight as of this encounter: 97.5 kg (215 lb).   Weight management plan: Discussed healthy diet and exercise guidelines    See Patient Instructions    No follow-ups on file.    Maulik Lee MD  [facility]          "

## 2021-11-30 NOTE — PATIENT INSTRUCTIONS
NO MORE IBUPROFEN OR NAPROXEN    FOLLOW BLAND DIET    START OMEPRAZOLE TWICE DAILY  Patient Education     Understanding Gastric Ulcers    A gastric ulcer is an open sore in the stomach lining. It is sometimes called a peptic ulcer. This is a more general term for ulcers that may be in the stomach or the upper part of the small intestine. Ulcers can cause pain. But they may also have no symptoms for a long time.   What causes gastric ulcers?  Gastric ulcers have a few common causes. To find the cause of your ulcer, your healthcare provider will give you an exam and take your health history. He or she may also order some tests. The main causes of gastric ulcers include:     Infection with the H. pylori (Helicobacter pylori) bacteria. This damages the stomach lining. Digestive juices can then harm the digestive tract.    Long-term use of some over-the-counter pain medicines such as nonsteroidal anti-inflammatory drugs (NSAIDs). These include ibuprofen, naproxen, and aspirin. This reduces the body s ability to protect the stomach from damage.  Other causes of gastric ulcers include:    Heavy alcohol use    A family history of ulcers    A tumor in the digestive tract (rare)  Symptoms of a gastric ulcer  Ulcer symptoms may appear and then go away for a time. Symptoms of a gastric ulcer may include:     Stomach pain, often a dull or burning feeling toward the top of your belly    Feeling full or bloated    Heartburn or acid reflux    Upset stomach (nausea) or vomiting    Vomiting blood    Lack of appetite    Weight loss    Black stool    Red blood in the stool  Treatment for a gastric ulcer  Treatment for gastric ulcers may depend on what is causing them. Treatment may include:    Not using over-the-counter medicines. You will likely need to stop taking these medicines. But in some cases these medicines can t be safely stopped. Check with your healthcare provider to see what is best for you.     Taking medicines to ease  symptoms. These medicines may help to reduce the amount of acid your stomach makes. They also may help coat your stomach lining.    Taking antibiotics. If your ulcer was caused by H. pylori, your provider will likely prescribe antibiotics to get rid of the infection.    Having an endoscopy. This is often done to check the stomach and diagnose the ulcer. In some cases, it can also treat the ulcer. It involves passing a flexible tube through your mouth into your stomach and small intestine.    Using a tube (catheter) to stop the bleeding. A thin tube is passed into one of your blood vessels. Special tools are used to help stop the bleeding.    Having surgery. You often may need this if the ulcer has caused severe symptoms.  Making some lifestyle changes can reduce ulcer symptoms. It may also prevent more damage to your digestive tract. These changes include:     Not taking over-the-counter pain medicines such as NSAIDs. Talk with your provider about using another type of pain reliever.    Not taking aspirin unless your provider has advised it    Limiting the amount of alcohol you drink    Quitting smoking  Possible complications of a gastric ulcer  Gastric ulcers can have serious complications. These can include:    Bleeding into the stomach    A hole (perforation) in the stomach    A blockage that interferes with food moving from your stomach to the small intestine  An ongoing infection with H. pylori may be a risk factor for stomach cancer. This is one reason it is important to get rid of this bacteria.   When to call your healthcare provider  Call your healthcare provider right away if you have any of these:    Vomiting blood, or vomit that looks like coffee grounds    Bloody, black, or tarry-looking stools    Fever of 100.4 F (38 C) or higher, or as directed by your provider    Chills    Pain that gets worse    Symptoms that don t get better with treatment, or symptoms that get worse    New symptoms  [employer] last  reviewed this educational content on 6/1/2019 2000-2021 [employer]. All rights reserved. This information is not intended as a substitute for professional medical care. Always follow your healthcare professional's instructions.

## 2021-12-01 ASSESSMENT — PATIENT HEALTH QUESTIONNAIRE - PHQ9: SUM OF ALL RESPONSES TO PHQ QUESTIONS 1-9: 1

## 2021-12-01 ASSESSMENT — ANXIETY QUESTIONNAIRES: GAD7 TOTAL SCORE: 0

## 2021-12-01 ASSESSMENT — ASTHMA QUESTIONNAIRES: ACT_TOTALSCORE: 23

## 2021-12-07 DIAGNOSIS — K29.00 ACUTE GASTRITIS, PRESENCE OF BLEEDING UNSPECIFIED, UNSPECIFIED GASTRITIS TYPE: ICD-10-CM

## 2021-12-07 RX ORDER — OMEPRAZOLE 20 MG/1
20 TABLET, DELAYED RELEASE ORAL 2 TIMES DAILY
Qty: 180 TABLET | Refills: 1 | Status: SHIPPED | OUTPATIENT
Start: 2021-12-07 | End: 2022-06-21

## 2021-12-07 NOTE — TELEPHONE ENCOUNTER
Omeprazole 90 day supply please   LOV 11/30/21  Acute gastritis, presence of bleeding unspecified, unspecified gastritis type  History and exam, along with normal CT, suspicious for gastritis with possible PUD.  No acute anemia or alarm symptoms.   - start PPI BID  - dietary changes discussed in detail  - no NSAIDs, EtOH until improved  - CBC with Diff/Plt ([facility])  - GI referral for likely endoscopy  - omeprazole (PRILOSEC OTC) 20 MG EC tablet; Take 1 tablet (20 mg) by mouth 2 times daily 30-60 minutes before breakfast  - Adult Gastro Ref - Consult Only; Future  - VENOUS COLLECTION

## 2022-01-03 ENCOUNTER — TRANSFERRED RECORDS (OUTPATIENT)
Dept: FAMILY MEDICINE | Facility: CLINIC | Age: 57
End: 2022-01-03

## 2022-01-03 DIAGNOSIS — J45.30 MILD PERSISTENT ASTHMA WITHOUT COMPLICATION: ICD-10-CM

## 2022-01-04 RX ORDER — MONTELUKAST SODIUM 10 MG/1
1 TABLET ORAL DAILY
Qty: 90 TABLET | Refills: 3 | Status: SHIPPED | OUTPATIENT
Start: 2022-01-04 | End: 2023-01-16

## 2022-01-06 DIAGNOSIS — J45.30 MILD PERSISTENT ASTHMA WITHOUT COMPLICATION: ICD-10-CM

## 2022-01-06 NOTE — TELEPHONE ENCOUNTER
Advair inhaler  LOV 12/11/2020- related   Last office visit 11/30/21  Mild persistent asthma without complication  -     montelukast (SINGULAIR) 10 MG tablet; Take 1 tablet (10 mg) by mouth daily  -     fluticasone-salmeterol (ADVAIR) 100-50 MCG/DOSE inhaler; Inhale 1 puff into the lungs every 12 hours  Restart Singulair daily & Advair 1 puff 2 times

## 2022-02-01 DIAGNOSIS — I10 ESSENTIAL HYPERTENSION, BENIGN: ICD-10-CM

## 2022-02-01 RX ORDER — LISINOPRIL 5 MG/1
TABLET ORAL
Qty: 30 TABLET | Refills: 11 | Status: SHIPPED | OUTPATIENT
Start: 2022-02-01 | End: 2023-02-13

## 2022-02-01 NOTE — TELEPHONE ENCOUNTER
Refill for lisinopril  LOV 12/11/2020  Essential hypertension  -     MI ART PRESS WAVEFORM ANALYS CENTRAL ART PRESSURE  Well controlled. Continue Lisinopril. Check home BP's and increase Lisinopril to 10 mg daily if > 140/90  BP Readings from Last 3 Encounters:   11/30/21 (!) 143/88   11/28/21 (!) 144/92   12/11/20 124/78     Component      Latest Ref Rng & Units 11/28/2021   Sodium      133 - 144 mmol/L 138   Potassium      3.4 - 5.3 mmol/L 4.0   Chloride      94 - 109 mmol/L 107   Carbon Dioxide      20 - 32 mmol/L 25   Anion Gap      3 - 14 mmol/L 6   Urea Nitrogen      7 - 30 mg/dL 12   Creatinine      0.66 - 1.25 mg/dL 0.73   Calcium      8.5 - 10.1 mg/dL 8.9   Glucose      70 - 99 mg/dL 115 (H)   Alkaline Phosphatase      40 - 150 U/L 100   AST      0 - 45 U/L 22   ALT      0 - 70 U/L 47   Protein Total      6.8 - 8.8 g/dL 7.4   Albumin      3.4 - 5.0 g/dL 3.5   Bilirubin Total      0.2 - 1.3 mg/dL 0.3   GFR Estimate      >60 mL/min/1.73m2 >90

## 2022-02-19 ENCOUNTER — HEALTH MAINTENANCE LETTER (OUTPATIENT)
Age: 57
End: 2022-02-19

## 2022-03-08 ENCOUNTER — TRANSFERRED RECORDS (OUTPATIENT)
Dept: FAMILY MEDICINE | Facility: CLINIC | Age: 57
End: 2022-03-08

## 2022-06-21 DIAGNOSIS — K29.00 ACUTE GASTRITIS, PRESENCE OF BLEEDING UNSPECIFIED, UNSPECIFIED GASTRITIS TYPE: ICD-10-CM

## 2022-10-22 ENCOUNTER — HEALTH MAINTENANCE LETTER (OUTPATIENT)
Age: 57
End: 2022-10-22

## 2022-12-04 ENCOUNTER — TRANSFERRED RECORDS (OUTPATIENT)
Dept: FAMILY MEDICINE | Facility: CLINIC | Age: 57
End: 2022-12-04

## 2022-12-04 LAB
CHOLESTEROL (EXTERNAL): 164 MG/DL (ref 0–200)
CREATININE (EXTERNAL): 0.83 MG/DL (ref 0.7–1.3)
GFR ESTIMATED (EXTERNAL): 0 ML/MIN/1.73M2
GFR ESTIMATED (IF AFRICAN AMERICAN) (EXTERNAL): 0 ML/MIN/1.73M2
HDLC SERPL-MCNC: 48 MG/DL (ref 40–99)
LDL CHOLESTEROL (EXTERNAL): 0 MG/DL (ref 0–0)
LDL CHOLESTEROL CALCULATED (EXTERNAL): 98 MG/DL (ref 0–100)
Lab: 0.96 (ref 0–4)
NON HDL CHOLESTEROL (EXTERNAL): 116 MG/DL (ref 0–130)
TRIGLYCERIDES (EXTERNAL): 89 MG/DL (ref 0–150)

## 2023-01-14 DIAGNOSIS — J45.30 MILD PERSISTENT ASTHMA WITHOUT COMPLICATION: ICD-10-CM

## 2023-01-16 RX ORDER — MONTELUKAST SODIUM 10 MG/1
1 TABLET ORAL DAILY
Qty: 30 TABLET | Refills: 11 | Status: SHIPPED | OUTPATIENT
Start: 2023-01-16 | End: 2024-01-29

## 2023-02-12 DIAGNOSIS — I10 ESSENTIAL HYPERTENSION, BENIGN: ICD-10-CM

## 2023-02-13 NOTE — TELEPHONE ENCOUNTER
LISINOPRIL  LOV (ER F/U) 11/30/21  LAST LABS 11/28/21    DUE FOR MED CHECK OR CPX    BP Readings from Last 3 Encounters:   11/30/21 (!) 143/88   11/28/21 (!) 144/92   12/11/20 124/78     Last Comprehensive Metabolic Panel:  Sodium   Date Value Ref Range Status   11/28/2021 138 133 - 144 mmol/L Final   02/07/2019 140 133 - 144 mmol/L Final     Potassium   Date Value Ref Range Status   11/28/2021 4.0 3.4 - 5.3 mmol/L Final   02/07/2019 4.3 3.4 - 5.3 mmol/L Final     Chloride   Date Value Ref Range Status   11/28/2021 107 94 - 109 mmol/L Final   02/07/2019 109 94 - 109 mmol/L Final     Carbon Dioxide   Date Value Ref Range Status   02/07/2019 26 20 - 32 mmol/L Final     Carbon Dioxide (CO2)   Date Value Ref Range Status   11/28/2021 25 20 - 32 mmol/L Final     Anion Gap   Date Value Ref Range Status   11/28/2021 6 3 - 14 mmol/L Final   02/07/2019 5 3 - 14 mmol/L Final     Glucose   Date Value Ref Range Status   11/28/2021 115 (H) 70 - 99 mg/dL Final   02/07/2019 95 70 - 99 mg/dL Final     Urea Nitrogen   Date Value Ref Range Status   11/28/2021 12 7 - 30 mg/dL Final   02/07/2019 21 7 - 30 mg/dL Final     Creatinine   Date Value Ref Range Status   11/28/2021 0.73 0.66 - 1.25 mg/dL Final   12/05/2020 0.85 0.70 - 1.33 mg/dL Final     GFR Estimate   Date Value Ref Range Status   11/28/2021 >90 >60 mL/min/1.73m2 Final     Comment:     As of July 11, 2021, eGFR is calculated by the CKD-EPI creatinine equation, without race adjustment. eGFR can be influenced by muscle mass, exercise, and diet. The reported eGFR is an estimation only and is only applicable if the renal function is stable.   02/07/2019 >90 >60 mL/min/[1.73_m2] Final     Comment:     Non  GFR Calc  Starting 12/18/2018, serum creatinine based estimated GFR (eGFR) will be   calculated using the Chronic Kidney Disease Epidemiology Collaboration   (CKD-EPI) equation.       Calcium   Date Value Ref Range Status   11/28/2021 8.9 8.5 - 10.1 mg/dL Final    02/07/2019 8.4 (L) 8.5 - 10.1 mg/dL Final

## 2023-02-14 RX ORDER — LISINOPRIL 5 MG/1
TABLET ORAL
Qty: 30 TABLET | Refills: 0 | Status: SHIPPED | OUTPATIENT
Start: 2023-02-14 | End: 2023-03-12

## 2023-03-11 DIAGNOSIS — I10 ESSENTIAL HYPERTENSION, BENIGN: ICD-10-CM

## 2023-03-12 RX ORDER — LISINOPRIL 5 MG/1
TABLET ORAL
Qty: 30 TABLET | Refills: 0 | Status: SHIPPED | OUTPATIENT
Start: 2023-03-12 | End: 2023-03-20

## 2023-03-13 ASSESSMENT — ENCOUNTER SYMPTOMS
NERVOUS/ANXIOUS: 0
CHILLS: 0
DIARRHEA: 0
HEADACHES: 0
COUGH: 0
DYSURIA: 0
PARESTHESIAS: 0
EYE PAIN: 0
PALPITATIONS: 0
NAUSEA: 0
HEMATOCHEZIA: 0
ABDOMINAL PAIN: 0
SORE THROAT: 0
WEAKNESS: 0
JOINT SWELLING: 0
FREQUENCY: 0
SHORTNESS OF BREATH: 0
ARTHRALGIAS: 0
HEMATURIA: 0
CONSTIPATION: 0
HEARTBURN: 0
MYALGIAS: 0
FEVER: 0

## 2023-03-15 NOTE — PROGRESS NOTES
3  SUBJECTIVE:   CC: Bill Saunders is an 57 year old male who presents for preventive health visit.       Patient has been advised of split billing requirements and indicates understanding: Yes  Healthy Habits:  Answers for HPI/ROS submitted by the patient on 3/13/2023  Frequency of exercise:: 4-5 days/week  Getting at least 3 servings of Calcium per day:: Yes  Diet:: Regular (no restrictions)  Taking medications regularly:: Yes  Medication side effects:: None  Bi-annual eye exam:: Yes  Dental care twice a year:: Yes  Sleep apnea or symptoms of sleep apnea:: None  abdominal pain: No  Blood in stool: No  Blood in urine: No  chest pain: No  chills: No  congestion: No  constipation: No  cough: No  diarrhea: No  ear pain: No  eye pain: No  nervous/anxious: No  fever: No  frequency: No  genital sores: No  headaches: No  hearing loss: No  heartburn: No  arthralgias: No  joint swelling: No  peripheral edema: No  mood changes: No  myalgias: No  nausea: No  dysuria: No  palpitations: No  Skin sensation changes: No  sore throat: No  urgency: No  rash: No  shortness of breath: No  visual disturbance: No  weakness: No  impotence: No  penile discharge: No  Additional concerns today:: No  Duration of exercise:: 45-60 minutes        PROBLEMS TO ADD ON...  Asthma stable.  Has not had any recent breathing troubles beyond usual baseline.  Has not any recent acute respiratory events.  Using medication as directed with reported side effects    ACT Total Scores 10/25/2018 12/11/2020 11/30/2021   ACT TOTAL SCORE - - -   ASTHMA ER VISITS - - -   ASTHMA HOSPITALIZATIONS - - -   ACT TOTAL SCORE (Goal Greater than or Equal to 20) 23 21 23   In the past 12 months, how many times did you visit the emergency room for your asthma without being admitted to the hospital? 0 0 0   In the past 12 months, how many times were you hospitalized overnight because of your asthma? 0 0 0         Today's PHQ-2 Score:   PHQ-2 ( 1999 [employer]) 3/13/2023  11/30/2021   Q1: Little interest or pleasure in doing things 0 0   Q2: Feeling down, depressed or hopeless 0 0   PHQ-2 Score 0 0   PHQ-2 Total Score (12-17 Years)- Positive if 3 or more points; Administer PHQ-A if positive - -   Q1: Little interest or pleasure in doing things Not at all -   Q2: Feeling down, depressed or hopeless Not at all -   PHQ-2 Score 0 -       Abuse: Current or Past(Physical, Sexual or Emotional)- no  Do you feel safe in your environment? Yes        Social History     Tobacco Use     Smoking status: Never     Smokeless tobacco: Never   Substance Use Topics     Alcohol use: Yes     Alcohol/week: 5.0 standard drinks     Types: 6 Cans of beer per week     Comment: 7 per week     If you drink alcohol do you typically have >3 drinks per day or >7 drinks per week? No                      Last PSA:   PSA   Date Value Ref Range Status   12/05/2020 0.9 0 - 4.0 ng/mL Final       Reviewed orders with patient. Reviewed health maintenance and updated orders accordingly - Yes  Lab work is in process    Reviewed and updated as needed this visit by clinical staff                  Reviewed and updated as needed this visit by Provider                 Past Medical History:   Diagnosis Date     Essential hypertension, benign      Mild persistent asthma       Past Surgical History:   Procedure Laterality Date     HC REPAIR OF NASAL SEPTUM  01/01/1982    repair fracture     HIP SURGERY Left 11/20/2018     HIP SURGERY Right 07/24/2018     REPAIR TENDON ACHILLES  05/01/2010    Left Achilles Tendon     ROTATOR CUFF REPAIR RT/LT Right 01/2016     SURGICAL HISTORY OF -   01/01/1998    extensor tendon repair right hand       ROS:  CONSTITUTIONAL: NEGATIVE for fever, chills, change in weight  INTEGUMENTARY/SKIN: NEGATIVE for worrisome rashes, moles or lesions  EYES: NEGATIVE for vision changes or irritation  ENT: NEGATIVE for ear, mouth and throat problems  RESP: NEGATIVE for significant cough or SOB  CV: NEGATIVE for  chest pain, palpitations or peripheral edema  GI: NEGATIVE for nausea, abdominal pain, heartburn, or change in bowel habits   male: negative for dysuria, hematuria, decreased urinary stream, erectile dysfunction, urethral discharge  MUSCULOSKELETAL: NEGATIVE for significant arthralgias or myalgia  NEURO: NEGATIVE for weakness, dizziness or paresthesias  PSYCHIATRIC: NEGATIVE for changes in mood or affect    OBJECTIVE:   /78   Wt 100.2 kg (221 lb)   BMI 31.71 kg/m    EXAM:  GENERAL: healthy, alert and no distress  EYES: Eyes grossly normal to inspection, PERRL and conjunctivae and sclerae normal  HENT: ear canals and TM's normal, nose and mouth without ulcers or lesions  NECK: no adenopathy, no asymmetry, masses, or scars and thyroid normal to palpation  RESP: lungs clear to auscultation - no rales, rhonchi or wheezes  CV: regular rate and rhythm, normal S1 S2, no S3 or S4, no murmur, click or rub, no peripheral edema and peripheral pulses strong  ABDOMEN: soft, nontender, no hepatosplenomegaly, no masses and bowel sounds normal  MS: no gross musculoskeletal defects noted, no edema  SKIN: no suspicious lesions or rashes  NEURO: Normal strength and tone, mentation intact and speech normal  PSYCH: mentation appears normal, affect normal/bright    Diagnostic Test Results:  Labs reviewed in Epic    ASSESSMENT/PLAN:   Diagnoses and all orders for this visit:    Routine general medical examination at a health care facility    Essential hypertension, benign  Reviewed his current HTN management. Discussed our goal for him is a systolic pressure at or below 128 and diastolic pressure at or below 83.  We today managed his prescriptions with refills ensured to ensure availabilty of current medications.  Discussed the importance for aggressive management of HTN to prevent vascular complications later.  Recommended lower fat, lower carbohydrate, and lower sodium (<2000 mg)diet. Required intervals for follow up on HTN,  lab studies reviewed.    Strongly recommened he follow his blood pressures outside the clinic to ensure that BPs are remaining within guidelines,.  Instructed to contact me if the readings are not within guidelines on a regular basis so we can adjust treatment as needed.    -     lisinopril (ZESTRIL) 5 MG tablet; Take 1 tablet (5 mg) by mouth daily  -     [place] [place]    History of gastritis, presence of bleeding unspecified, unspecified gastritis type  Now feeling better uses Prilosec PRN    Mild persistent asthma without complication  Discussed asthma in detail and discussed how their breathing symptoms and the pattern of the shortness of breath fits with asthma.   Discussed pathophysiology of asthma and treatments based on the degree of symptoms.   Discussed triggers for asthma in general, and those specific to the patient.  Also told them to anticipate potentially more intense coughing and shortness of breath with any URI (regardless of bacterial or viral etiology) and to be prepared to use the albuterol more often if needed and to return and see me for any such exacerbation.    I recommended having rescue inhaler available and using all throughout the year as needed, demonstrated proper MDI technique for them.  Emphasized the need for them to let me know if there are any changes for the worse in their breathing related to asthma, either acute or chronic and to seek emergency care if the breathing acutely worsens in a sever manner.      -     albuterol (VENTOLIN HFA) 108 (90 Base) MCG/ACT inhaler; Inhale 2 puffs into the lungs every 6 hours as needed for wheezing or shortness of breath    Screening for heart disease  -     [place] [place]    Screening for prostate cancer  -     PSA Serum ([facility])    Screening for HIV without presence of risk factors  -     HIV 1/0/2 Rflx ([facility])    Need for vaccination  -     VACCINE ADMINISTRATION, INITIAL  -     TDAP (ADACEL,BOOSTRIX)  -     PNEUMOCOCCAL 20  "VALENT CONJUGATE (PREVNAR 20)    History of colonic polyps  Was supposed to have 1 year follow up  -     Adult GI  Referral - Procedure Only; Future    Other orders  -     Comp. Metabolic Panel (14) ([facility])        Patient has been advised of split billing requirements and indicates understanding: Yes  COUNSELING:  Reviewed preventive health counseling, as reflected in patient instructions       Regular exercise       Healthy diet/nutrition    Estimated body mass index is 31.71 kg/m  as calculated from the following:    Height as of 11/28/21: 1.778 m (5' 10\").    Weight as of this encounter: 100.2 kg (221 lb).        He reports that he has never smoked. He has never used smokeless tobacco.      Counseling Resources:  ATP IV Guidelines  Pooled Cohorts Equation Calculator  FRAX Risk Assessment  ICSI Preventive Guidelines  Dietary Guidelines for Americans, 2010  USDA's MyPlate  ASA Prophylaxis  Lung CA Screening    Juan C Mo MD  [facility]  "

## 2023-03-15 NOTE — PATIENT INSTRUCTIONS
Preventive Health Recommendations  Male Ages 50 - 64    Yearly exam:             See your health care provider every year in order to  o   Review health changes.   o   Discuss preventive care.    o   Review your medicines if your doctor has prescribed any.   Have a cholesterol test every 5 years, or more frequently if you are at risk for high cholesterol/heart disease.   Have a diabetes test (fasting glucose) every three years. If you are at risk for diabetes, you should have this test more often.   Have a colonoscopy at age 50, or have a yearly FIT test (stool test). These exams will check for colon cancer.    Talk with your health care provider about whether or not a prostate cancer screening test (PSA) is right for you.  You should be tested each year for STDs (sexually transmitted diseases), if you re at risk.     Shots: Get a flu shot each year. Get a tetanus shot every 10 years.     Nutrition:  Eat at least 5 servings of fruits and vegetables daily.   Eat whole-grain bread, whole-wheat pasta and brown rice instead of white grains and rice.   Get adequate Calcium and Vitamin D.     Lifestyle  Exercise for at least 150 minutes a week (30 minutes a day, 5 days a week). This will help you control your weight and prevent disease.   Limit alcohol to one drink per day.   No smoking.   Wear sunscreen to prevent skin cancer.   See your dentist every six months for an exam and cleaning.   See your eye doctor every 1 to 2 years.

## 2023-03-20 ENCOUNTER — OFFICE VISIT (OUTPATIENT)
Dept: FAMILY MEDICINE | Facility: CLINIC | Age: 58
End: 2023-03-20

## 2023-03-20 VITALS — DIASTOLIC BLOOD PRESSURE: 78 MMHG | SYSTOLIC BLOOD PRESSURE: 128 MMHG | WEIGHT: 221 LBS | BODY MASS INDEX: 31.71 KG/M2

## 2023-03-20 DIAGNOSIS — Z87.19 HX OF GASTROESOPHAGEAL REFLUX (GERD): ICD-10-CM

## 2023-03-20 DIAGNOSIS — Z23 NEED FOR VACCINATION: ICD-10-CM

## 2023-03-20 DIAGNOSIS — Z11.4 SCREENING FOR HIV WITHOUT PRESENCE OF RISK FACTORS: ICD-10-CM

## 2023-03-20 DIAGNOSIS — I10 ESSENTIAL HYPERTENSION, BENIGN: ICD-10-CM

## 2023-03-20 DIAGNOSIS — Z86.0100 HISTORY OF COLONIC POLYPS: ICD-10-CM

## 2023-03-20 DIAGNOSIS — Z11.59 ENCOUNTER FOR HCV SCREENING TEST FOR LOW RISK PATIENT: ICD-10-CM

## 2023-03-20 DIAGNOSIS — Z12.5 SCREENING FOR PROSTATE CANCER: ICD-10-CM

## 2023-03-20 DIAGNOSIS — Z13.6 SCREENING FOR HEART DISEASE: ICD-10-CM

## 2023-03-20 DIAGNOSIS — Z00.00 ROUTINE GENERAL MEDICAL EXAMINATION AT A HEALTH CARE FACILITY: Primary | ICD-10-CM

## 2023-03-20 DIAGNOSIS — J45.30 MILD PERSISTENT ASTHMA WITHOUT COMPLICATION: ICD-10-CM

## 2023-03-20 LAB
% GRANULOCYTES: 78.6 % (ref 42.2–75.2)
CHOL/HDL RATIO (RMG): 4.8 MG/DL (ref 0–4.5)
CHOLESTEROL: 163 MG/DL (ref 100–199)
HCT VFR BLD AUTO: 45 % (ref 39–51)
HDL (RMG): 34 MG/DL (ref 40–?)
HEMOGLOBIN: 15.4 G/DL (ref 13.4–17.5)
LDL CALCULATED (RMG): 76 MG/DL (ref 0–130)
LYMPHOCYTES NFR BLD AUTO: 16.8 % (ref 20.5–51.1)
MCH RBC QN AUTO: 28.8 PG (ref 27–31)
MCHC RBC AUTO-ENTMCNC: 34.3 G/DL (ref 33–37)
MCV RBC AUTO: 84 FL (ref 80–100)
MONOCYTES NFR BLD AUTO: 4.6 % (ref 1.7–9.3)
PLATELET # BLD AUTO: 265 K/UL (ref 140–450)
RBC # BLD AUTO: 5.36 X10/CMM (ref 4.2–5.9)
TRIGLYCERIDES (RMG): 259 MG/DL (ref 0–149)
WBC # BLD AUTO: 7.7 X10/CMM (ref 3.8–11)

## 2023-03-20 PROCEDURE — 85025 COMPLETE CBC W/AUTO DIFF WBC: CPT | Performed by: FAMILY MEDICINE

## 2023-03-20 PROCEDURE — 99213 OFFICE O/P EST LOW 20 MIN: CPT | Mod: 25 | Performed by: FAMILY MEDICINE

## 2023-03-20 PROCEDURE — 90471 IMMUNIZATION ADMIN: CPT | Mod: 59 | Performed by: FAMILY MEDICINE

## 2023-03-20 PROCEDURE — 90715 TDAP VACCINE 7 YRS/> IM: CPT | Performed by: FAMILY MEDICINE

## 2023-03-20 PROCEDURE — 36415 COLL VENOUS BLD VENIPUNCTURE: CPT | Performed by: FAMILY MEDICINE

## 2023-03-20 PROCEDURE — 80061 LIPID PANEL: CPT | Mod: QW | Performed by: FAMILY MEDICINE

## 2023-03-20 PROCEDURE — 99396 PREV VISIT EST AGE 40-64: CPT | Performed by: FAMILY MEDICINE

## 2023-03-20 PROCEDURE — 90677 PCV20 VACCINE IM: CPT | Performed by: FAMILY MEDICINE

## 2023-03-20 PROCEDURE — 90472 IMMUNIZATION ADMIN EACH ADD: CPT | Mod: 59 | Performed by: FAMILY MEDICINE

## 2023-03-20 RX ORDER — ALBUTEROL SULFATE 90 UG/1
2 AEROSOL, METERED RESPIRATORY (INHALATION) EVERY 6 HOURS PRN
Qty: 18 G | Refills: 11 | Status: SHIPPED | OUTPATIENT
Start: 2023-03-20 | End: 2024-03-27

## 2023-03-20 RX ORDER — LISINOPRIL 5 MG/1
5 TABLET ORAL DAILY
Qty: 30 TABLET | Refills: 11 | Status: SHIPPED | OUTPATIENT
Start: 2023-03-20 | End: 2023-11-14

## 2023-03-20 ASSESSMENT — ASTHMA QUESTIONNAIRES: ACT_TOTALSCORE: 22

## 2023-03-21 LAB
ALBUMIN SERPL-MCNC: 4.3 G/DL (ref 3.8–4.9)
ALBUMIN/GLOB SERPL: 1.9 {RATIO} (ref 1.2–2.2)
ALP SERPL-CCNC: 89 IU/L (ref 44–121)
ALT SERPL-CCNC: 24 IU/L (ref 0–44)
AST SERPL-CCNC: 16 IU/L (ref 0–40)
BILIRUB SERPL-MCNC: 0.3 MG/DL (ref 0–1.2)
BUN SERPL-MCNC: 18 MG/DL (ref 6–24)
BUN/CREATININE RATIO: 24 (ref 9–20)
CALCIUM SERPL-MCNC: 9.5 MG/DL (ref 8.7–10.2)
CHLORIDE SERPLBLD-SCNC: 103 MMOL/L (ref 96–106)
CREAT SERPL-MCNC: 0.76 MG/DL (ref 0.76–1.27)
EGFR: 104 ML/MIN/1.73
GLOBULIN, TOTAL: 2.3 G/DL (ref 1.5–4.5)
GLUCOSE SERPL-MCNC: 93 MG/DL (ref 70–99)
HCV AB SERPL QL IA: NON REACTIVE
HIV SCREEN 4TH GEN WITH RFLX: NON REACTIVE
POTASSIUM SERPL-SCNC: 5 MMOL/L (ref 3.5–5.2)
PROT SERPL-MCNC: 6.6 G/DL (ref 6–8.5)
PSA NG/ML: 1.4 NG/ML (ref 0–4)
SODIUM SERPL-SCNC: 141 MMOL/L (ref 134–144)
TOTAL CO2: 25 MMOL/L (ref 20–29)

## 2023-03-23 NOTE — RESULT ENCOUNTER NOTE
Dear Bill,     I am writing to report that your included test results are as expected.    Many labs contain some results that are slightly outside of the normal range, I have reviewed any of these results and they require no changes at this time.    Juan C Mo MD

## 2023-03-27 ENCOUNTER — TRANSFERRED RECORDS (OUTPATIENT)
Dept: FAMILY MEDICINE | Facility: CLINIC | Age: 58
End: 2023-03-27

## 2023-04-05 ENCOUNTER — DOCUMENTATION ONLY (OUTPATIENT)
Dept: GASTROENTEROLOGY | Facility: CLINIC | Age: 58
End: 2023-04-05
Payer: COMMERCIAL

## 2023-04-05 NOTE — PROGRESS NOTES
Colorectal Cancer Screening Results  External result received from: [facility]   Date of Procedure: 3/27/2023    Health Maintenance updated based on provider recommendations/ASGE Guidelines.      Ronda Garcia RN on 4/5/2023 at 9:59 AM

## 2023-08-03 ASSESSMENT — ANXIETY QUESTIONNAIRES
GAD7 TOTAL SCORE: 2
6. BECOMING EASILY ANNOYED OR IRRITABLE: NOT AT ALL
5. BEING SO RESTLESS THAT IT IS HARD TO SIT STILL: NOT AT ALL
4. TROUBLE RELAXING: NOT AT ALL
1. FEELING NERVOUS, ANXIOUS, OR ON EDGE: SEVERAL DAYS
2. NOT BEING ABLE TO STOP OR CONTROL WORRYING: NOT AT ALL
7. FEELING AFRAID AS IF SOMETHING AWFUL MIGHT HAPPEN: NOT AT ALL
GAD7 TOTAL SCORE: 2
3. WORRYING TOO MUCH ABOUT DIFFERENT THINGS: SEVERAL DAYS
IF YOU CHECKED OFF ANY PROBLEMS ON THIS QUESTIONNAIRE, HOW DIFFICULT HAVE THESE PROBLEMS MADE IT FOR YOU TO DO YOUR WORK, TAKE CARE OF THINGS AT HOME, OR GET ALONG WITH OTHER PEOPLE: NOT DIFFICULT AT ALL

## 2023-08-03 ASSESSMENT — PATIENT HEALTH QUESTIONNAIRE - PHQ9
SUM OF ALL RESPONSES TO PHQ QUESTIONS 1-9: 3
10. IF YOU CHECKED OFF ANY PROBLEMS, HOW DIFFICULT HAVE THESE PROBLEMS MADE IT FOR YOU TO DO YOUR WORK, TAKE CARE OF THINGS AT HOME, OR GET ALONG WITH OTHER PEOPLE: NOT DIFFICULT AT ALL
SUM OF ALL RESPONSES TO PHQ QUESTIONS 1-9: 3

## 2023-08-04 ENCOUNTER — OFFICE VISIT (OUTPATIENT)
Dept: FAMILY MEDICINE | Facility: CLINIC | Age: 58
End: 2023-08-04

## 2023-08-04 VITALS
SYSTOLIC BLOOD PRESSURE: 123 MMHG | BODY MASS INDEX: 30.42 KG/M2 | HEART RATE: 69 BPM | OXYGEN SATURATION: 98 % | DIASTOLIC BLOOD PRESSURE: 84 MMHG | HEIGHT: 69 IN | WEIGHT: 205.4 LBS

## 2023-08-04 DIAGNOSIS — F32.1 CURRENT MODERATE EPISODE OF MAJOR DEPRESSIVE DISORDER WITHOUT PRIOR EPISODE (H): ICD-10-CM

## 2023-08-04 DIAGNOSIS — F41.9 ANXIETY: Primary | ICD-10-CM

## 2023-08-04 PROCEDURE — 99214 OFFICE O/P EST MOD 30 MIN: CPT | Performed by: FAMILY MEDICINE

## 2023-08-04 RX ORDER — CITALOPRAM HYDROBROMIDE 20 MG/1
20 TABLET ORAL DAILY
Qty: 30 TABLET | Refills: 1 | Status: SHIPPED | OUTPATIENT
Start: 2023-08-04 | End: 2023-09-06

## 2023-08-04 NOTE — PROGRESS NOTES
"  Ronak Khan is a 57 year old patient who presents to clinic for evaluation.  He is here with his wife.  He is overall in good health.  He reports about 6 months of decreased mood, increased anxiety, lower energy, sleeping more, difficulty concentrating and lack of motivation.  His wife says he is relying on her more and more to make decisions.  He denies focal neurologic deficits.  No weight loss, dysarthria, dysphagia, vision changes.  No hallucinations.  No suicidal ideation.        Review of Systems   Constitutional, HEENT, cardiovascular, pulmonary, GI, , musculoskeletal, neuro, skin, endocrine and psych systems are negative, except as otherwise noted.      Objective    /84   Pulse 69   Ht 1.759 m (5' 9.25\")   Wt 93.2 kg (205 lb 6.4 oz)   SpO2 98%   BMI 30.11 kg/m      General: Well appearing, NAD  Psych: normal mood and affect.  Normal thought process and content.  Normal insight and judgment.          No results found for this or any previous visit (from the past 24 hour(s)).    Anxiety  It seems his symptoms may all be related to anxiety and depression; however, a more significant psychiatric diagnosis, neurologic disease, or other cause cannot be ruled out.  Discussed options.  Will start with medication and therapy and close follow up.  If not improving lab evaluation and consideration of brain imaging and psychiatric referral will be considered.    - citalopram (CELEXA) 20 MG tablet; Take 1 tablet (20 mg) by mouth daily  - [facility]  Referral; Future    Current moderate episode of major depressive disorder without prior episode (H)  - citalopram (CELEXA) 20 MG tablet; Take 1 tablet (20 mg) by mouth daily  - [facility]  Referral; Future    Follow up in 4-5 weeks    Patient is agreement with the assessment and plan as outlined above.  All questions answered.  Red flag symptoms that should prompt emergent evaluation discussed and " understood.          Answers submitted by the patient for this visit:  Patient Health Questionnaire (Submitted on 8/3/2023)  If you checked off any problems, how difficult have these problems made it for you to do your work, take care of things at home, or get along with other people?: Not difficult at all  PHQ9 TOTAL SCORE: 3  CHANO-7 (Submitted on 8/3/2023)  CHANO 7 TOTAL SCORE: 2  Depression / Anxiety Questionnaire (Submitted on 8/3/2023)  Chief Complaint: Chronic problems general questions HPI Form  Depression/Anxiety: Anxiety  Anxiety only (Submitted on 8/3/2023)  Chief Complaint: Chronic problems general questions HPI Form  Anxiety since last: : good  Other associated symotome: : No  Significant life event: : job concerns  Anxious:: No  Current substance use:: No  General Questionnaire (Submitted on 8/3/2023)  Chief Complaint: Chronic problems general questions HPI Form  How many servings of fruits and vegetables do you eat daily?: 4 or more  On average, how many sweetened beverages do you drink each day (Examples: soda, juice, sweet tea, etc.  Do NOT count diet or artificially sweetened beverages)?: 1  How many minutes a day do you exercise enough to make your heart beat faster?: 20 to 29  How many days a week do you exercise enough to make your heart beat faster?: 5  How many days per week do you miss taking your medication?: 0

## 2023-09-06 ENCOUNTER — OFFICE VISIT (OUTPATIENT)
Dept: FAMILY MEDICINE | Facility: CLINIC | Age: 58
End: 2023-09-06

## 2023-09-06 VITALS
SYSTOLIC BLOOD PRESSURE: 105 MMHG | WEIGHT: 206.6 LBS | OXYGEN SATURATION: 97 % | HEART RATE: 65 BPM | BODY MASS INDEX: 30.29 KG/M2 | DIASTOLIC BLOOD PRESSURE: 66 MMHG

## 2023-09-06 DIAGNOSIS — F41.9 ANXIETY: ICD-10-CM

## 2023-09-06 DIAGNOSIS — F32.1 CURRENT MODERATE EPISODE OF MAJOR DEPRESSIVE DISORDER WITHOUT PRIOR EPISODE (H): ICD-10-CM

## 2023-09-06 DIAGNOSIS — I10 ESSENTIAL HYPERTENSION: Primary | ICD-10-CM

## 2023-09-06 PROCEDURE — 99214 OFFICE O/P EST MOD 30 MIN: CPT | Performed by: FAMILY MEDICINE

## 2023-09-06 RX ORDER — CITALOPRAM HYDROBROMIDE 20 MG/1
20 TABLET ORAL DAILY
Qty: 30 TABLET | Refills: 1 | Status: SHIPPED | OUTPATIENT
Start: 2023-09-06 | End: 2023-09-28

## 2023-09-06 ASSESSMENT — ASTHMA QUESTIONNAIRES: ACT_TOTALSCORE: 24

## 2023-09-06 ASSESSMENT — ANXIETY QUESTIONNAIRES
6. BECOMING EASILY ANNOYED OR IRRITABLE: NOT AT ALL
1. FEELING NERVOUS, ANXIOUS, OR ON EDGE: MORE THAN HALF THE DAYS
GAD7 TOTAL SCORE: 11
7. FEELING AFRAID AS IF SOMETHING AWFUL MIGHT HAPPEN: MORE THAN HALF THE DAYS
GAD7 TOTAL SCORE: 11
3. WORRYING TOO MUCH ABOUT DIFFERENT THINGS: MORE THAN HALF THE DAYS
IF YOU CHECKED OFF ANY PROBLEMS ON THIS QUESTIONNAIRE, HOW DIFFICULT HAVE THESE PROBLEMS MADE IT FOR YOU TO DO YOUR WORK, TAKE CARE OF THINGS AT HOME, OR GET ALONG WITH OTHER PEOPLE: NOT DIFFICULT AT ALL
5. BEING SO RESTLESS THAT IT IS HARD TO SIT STILL: SEVERAL DAYS
2. NOT BEING ABLE TO STOP OR CONTROL WORRYING: MORE THAN HALF THE DAYS

## 2023-09-06 ASSESSMENT — PATIENT HEALTH QUESTIONNAIRE - PHQ9
5. POOR APPETITE OR OVEREATING: MORE THAN HALF THE DAYS
SUM OF ALL RESPONSES TO PHQ QUESTIONS 1-9: 5

## 2023-09-06 NOTE — PROGRESS NOTES
Ronak Khan is a 58 year old patient who presents to clinic for follow up.  Titrated up to citalopram 20 mg about 3 weeks ago.  Some lightheadedness in the morning but no other side effects.  Anxiety modestly improved.  Less fidgety.  No other new concerns.    HTN: on lisinopril.  BP lower today.        Review of Systems   Constitutional, HEENT, cardiovascular, pulmonary, GI, , musculoskeletal, neuro, skin, endocrine and psych systems are negative, except as otherwise noted.      Objective    /66   Pulse 65   Wt 93.7 kg (206 lb 9.6 oz)   SpO2 97%   BMI 30.29 kg/m      General: Well appearing, NAD  Psych: normal mood and affect        No results found for this or any previous visit (from the past 24 hour(s)).    Anxiety  Mild improvement, stay at current dose and follow up by anali in 2 weeks  - citalopram (CELEXA) 20 MG tablet; Take 1 tablet (20 mg) by mouth daily    Current moderate episode of major depressive disorder without prior episode (H)  See above  - citalopram (CELEXA) 20 MG tablet; Take 1 tablet (20 mg) by mouth daily    HTN: low BP and some lightheadedness.  Stop lisinopril and monitor at home.

## 2023-09-28 DIAGNOSIS — F32.1 CURRENT MODERATE EPISODE OF MAJOR DEPRESSIVE DISORDER WITHOUT PRIOR EPISODE (H): ICD-10-CM

## 2023-09-28 DIAGNOSIS — F41.9 ANXIETY: ICD-10-CM

## 2023-09-28 RX ORDER — CITALOPRAM HYDROBROMIDE 20 MG/1
20 TABLET ORAL DAILY
Qty: 30 TABLET | Refills: 1 | Status: SHIPPED | OUTPATIENT
Start: 2023-09-28 | End: 2023-10-23

## 2023-10-23 DIAGNOSIS — F32.1 CURRENT MODERATE EPISODE OF MAJOR DEPRESSIVE DISORDER WITHOUT PRIOR EPISODE (H): ICD-10-CM

## 2023-10-23 DIAGNOSIS — F41.9 ANXIETY: ICD-10-CM

## 2023-10-23 RX ORDER — CITALOPRAM HYDROBROMIDE 20 MG/1
20 TABLET ORAL DAILY
Qty: 90 TABLET | Refills: 1 | Status: SHIPPED | OUTPATIENT
Start: 2023-10-23 | End: 2024-03-26

## 2023-10-23 NOTE — TELEPHONE ENCOUNTER
Med: Citalopram      LOV (related): 9/6/23      Due for F/U around[facility]     Next Appt: None               11/30/2021     3:05 PM 8/3/2023     8:00 PM 9/6/2023     4:59 PM   PHQ   PHQ-9 Total Score 1 3 5   Q9: Thoughts of better off dead/self-harm past 2 weeks Not at all Not at all Not at all           11/30/2021     3:05 PM 8/3/2023     8:10 PM 9/6/2023     4:59 PM   CHANO-7 SCORE   Total Score  2 (minimal anxiety)    Total Score 0 2 11

## 2023-11-17 ENCOUNTER — PATIENT OUTREACH (OUTPATIENT)
Dept: GASTROENTEROLOGY | Facility: CLINIC | Age: 58
End: 2023-11-17
Payer: COMMERCIAL

## 2023-12-06 ENCOUNTER — OFFICE VISIT (OUTPATIENT)
Dept: FAMILY MEDICINE | Facility: CLINIC | Age: 58
End: 2023-12-06

## 2023-12-06 VITALS
SYSTOLIC BLOOD PRESSURE: 133 MMHG | WEIGHT: 212.6 LBS | DIASTOLIC BLOOD PRESSURE: 80 MMHG | BODY MASS INDEX: 31.17 KG/M2 | HEART RATE: 60 BPM | OXYGEN SATURATION: 98 %

## 2023-12-06 DIAGNOSIS — F32.1 CURRENT MODERATE EPISODE OF MAJOR DEPRESSIVE DISORDER WITHOUT PRIOR EPISODE (H): ICD-10-CM

## 2023-12-06 DIAGNOSIS — I10 ESSENTIAL HYPERTENSION: Primary | ICD-10-CM

## 2023-12-06 DIAGNOSIS — R53.83 FATIGUE, UNSPECIFIED TYPE: ICD-10-CM

## 2023-12-06 DIAGNOSIS — F41.9 ANXIETY: ICD-10-CM

## 2023-12-06 DIAGNOSIS — J45.30 MILD PERSISTENT ASTHMA WITHOUT COMPLICATION: ICD-10-CM

## 2023-12-06 LAB
ANION GAP SERPL CALCULATED.3IONS-SCNC: 9 MMOL/L (ref 7–15)
BUN SERPL-MCNC: 17.8 MG/DL (ref 8–23)
CALCIUM SERPL-MCNC: 9.2 MG/DL (ref 8.6–10)
CHLORIDE SERPL-SCNC: 104 MMOL/L (ref 98–107)
CREAT SERPL-MCNC: 0.81 MG/DL (ref 0.67–1.17)
DEPRECATED HCO3 PLAS-SCNC: 26 MMOL/L (ref 22–29)
EGFRCR SERPLBLD CKD-EPI 2021: >90 ML/MIN/1.73M2
GLUCOSE SERPL-MCNC: 95 MG/DL (ref 70–99)
POTASSIUM SERPL-SCNC: 4.6 MMOL/L (ref 3.4–5.3)
SODIUM SERPL-SCNC: 139 MMOL/L (ref 135–145)
TSH SERPL DL<=0.005 MIU/L-ACNC: 1.35 UIU/ML (ref 0.3–4.2)

## 2023-12-06 PROCEDURE — 99214 OFFICE O/P EST MOD 30 MIN: CPT | Performed by: FAMILY MEDICINE

## 2023-12-06 PROCEDURE — 36415 COLL VENOUS BLD VENIPUNCTURE: CPT | Performed by: FAMILY MEDICINE

## 2023-12-06 PROCEDURE — 80048 BASIC METABOLIC PNL TOTAL CA: CPT | Performed by: FAMILY MEDICINE

## 2023-12-06 PROCEDURE — 84443 ASSAY THYROID STIM HORMONE: CPT | Performed by: FAMILY MEDICINE

## 2023-12-06 NOTE — PROGRESS NOTES
Answers submitted by the patient for this visit:  General Questionnaire (Submitted on 11/29/2023)  Chief Complaint: Chronic problems general questions HPI Form  How many servings of fruits and vegetables do you eat daily?: 4 or more  On average, how many sweetened beverages do you drink each day (Examples: soda, juice, sweet tea, etc.  Do NOT count diet or artificially sweetened beverages)?: 0  How many minutes a day do you exercise enough to make your heart beat faster?: 60 or more  How many days a week do you exercise enough to make your heart beat faster?: 6  How many days per week do you miss taking your medication?: 0  General Concern (Submitted on 11/29/2023)  Chief Complaint: Chronic problems general questions HPI Form  What is the reason for your visit today?: Blood pressure and blood in my stool  When did your symptoms begin?: 1-2 weeks ago  What are your symptoms?: Blood pressure was higher. Bloody stool was 3 times  How would you describe these symptoms?: Mild  Are your symptoms:: Improving  Have you had these symptoms before?: No        Subjective     Bill is a 58 year old patient who presents to clinic for follow up.    HTN: doing well on lisinopril 10.  Home readings at goal    Anxiety: improved on citalopram 20 but still struggles at times.  Not working again until April.    Fatigue: no chest pain, exertional symptoms.  Sleeping well.  Reports wide temperature swings    Review of Systems   Constitutional, HEENT, cardiovascular, pulmonary, GI, , musculoskeletal, neuro, skin, endocrine and psych systems are negative, except as otherwise noted.      Objective    /80   Pulse 60   Wt 96.4 kg (212 lb 9.6 oz)   SpO2 98%   BMI 31.17 kg/m      General: Well appearing, NAD  Psych: normal mood and affect        No results found for this or any previous visit (from the past 24 hour(s)).    Essential hypertension  At goal, cont lisinopril  - Basic metabolic panel; Future  - Basic metabolic  panel    Fatigue, unspecified type  Uncertain cause.  Check TSH  - TSH with free T4 reflex; Future  - TSH with free T4 reflex    Mild persistent asthma without complication  stable/controlled. Cont current medication(s) and treatment  - Asthma Action Plan (AAP); Future    Anxiety  Recommend working on increasing activities, being more engaged.  If not improved increase dose    Current moderate episode of major depressive disorder without prior episode (H)  stable/controlled. Cont current medication(s) and treatment    Follow up in 1-2 months

## 2024-01-28 DIAGNOSIS — J45.30 MILD PERSISTENT ASTHMA WITHOUT COMPLICATION: ICD-10-CM

## 2024-01-29 RX ORDER — MONTELUKAST SODIUM 10 MG/1
1 TABLET ORAL DAILY
Qty: 30 TABLET | Refills: 11 | Status: SHIPPED | OUTPATIENT
Start: 2024-01-29

## 2024-02-22 DIAGNOSIS — Z13.6 ENCOUNTER FOR SCREENING FOR CARDIOVASCULAR DISORDERS: ICD-10-CM

## 2024-02-22 DIAGNOSIS — I10 ESSENTIAL HYPERTENSION: Primary | ICD-10-CM

## 2024-02-22 DIAGNOSIS — Z13.0 ENCOUNTER FOR SCREENING FOR HEMATOLOGIC DISORDER: ICD-10-CM

## 2024-02-22 DIAGNOSIS — Z12.5 PROSTATE CANCER SCREENING: ICD-10-CM

## 2024-02-22 DIAGNOSIS — Z13.228 ENCOUNTER FOR SCREENING FOR METABOLIC DISORDER: ICD-10-CM

## 2024-03-20 SDOH — HEALTH STABILITY: PHYSICAL HEALTH: ON AVERAGE, HOW MANY DAYS PER WEEK DO YOU ENGAGE IN MODERATE TO STRENUOUS EXERCISE (LIKE A BRISK WALK)?: 5 DAYS

## 2024-03-20 SDOH — HEALTH STABILITY: PHYSICAL HEALTH: ON AVERAGE, HOW MANY MINUTES DO YOU ENGAGE IN EXERCISE AT THIS LEVEL?: 40 MIN

## 2024-03-20 ASSESSMENT — SOCIAL DETERMINANTS OF HEALTH (SDOH): HOW OFTEN DO YOU GET TOGETHER WITH FRIENDS OR RELATIVES?: TWICE A WEEK

## 2024-03-26 NOTE — PATIENT INSTRUCTIONS
Preventive Care Advice   This is general advice given by our system to help you stay healthy. However, your care team may have specific advice just for you. Please talk to your care team about your preventive care needs.  Nutrition  Eat 5 or more servings of fruits and vegetables each day.  Try wheat bread, brown rice and whole grain pasta (instead of white bread, rice, and pasta).  Get enough calcium and vitamin D. Check the label on foods and aim for 100% of the RDA (recommended daily allowance).  Lifestyle  Exercise at least 150 minutes each week   (30 minutes a day, 5 days a week).  Do muscle strengthening activities 2 days a week. These help control your weight and prevent disease.  No smoking.  Wear sunscreen to prevent skin cancer.  Have a dental exam and cleaning every 6 months.  Yearly exams  See your health care team every year to talk about:  Any changes in your health.  Any medicines your care team has prescribed.  Preventive care, family planning, and ways to prevent chronic diseases.  Shots (vaccines)   HPV shots (up to age 26), if you've never had them before.  Hepatitis B shots (up to age 59), if you've never had them before.  COVID-19 shot: Get this shot when it's due.  Flu shot: Get a flu shot every year.  Tetanus shot: Get a tetanus shot every 10 years.  Pneumococcal, hepatitis A, and RSV shots: Ask your care team if you need these based on your risk.  Shingles shot (for age 50 and up).  General health tests  Diabetes screening:  Starting at age 35, Get screened for diabetes at least every 3 years.  If you are younger than age 35, ask your care team if you should be screened for diabetes.  Cholesterol test: At age 39, start having a cholesterol test every 5 years, or more often if advised.  Bone density scan (DEXA): At age 50, ask your care team if you should have this scan for osteoporosis (brittle bones).  Hepatitis C: Get tested at least once in your life.  STIs (sexually transmitted  infections)  Before age 24: Ask your care team if you should be screened for STIs.  After age 24: Get screened for STIs if you're at risk. You are at risk for STIs (including HIV) if:  You are sexually active with more than one person.  You don't use condoms every time.  You or a partner was diagnosed with a sexually transmitted infection.  If you are at risk for HIV, ask about PrEP medicine to prevent HIV.  Get tested for HIV at least once in your life, whether you are at risk for HIV or not.  Cancer screening tests  Cervical cancer screening: If you have a cervix, begin getting regular cervical cancer screening tests at age 21. Most people who have regular screenings with normal results can stop after age 65. Talk about this with your provider.  Breast cancer scan (mammogram): If you've ever had breasts, begin having regular mammograms starting at age 40. This is a scan to check for breast cancer.  Colon cancer screening: It is important to start screening for colon cancer at age 45.  Have a colonoscopy test every 10 years (or more often if you're at risk) Or, ask your provider about stool tests like a FIT test every year or Cologuard test every 3 years.  To learn more about your testing options, visit: https://www.[email or web address]/571568.pdf.  For help making a decision, visit: https://bit.ly/rg87355.  Prostate cancer screening test: If you have a prostate and are age 55 to 69, ask your provider if you would benefit from a yearly prostate cancer screening test.  Lung cancer screening: If you are a current or former smoker age 50 to 80, ask your care team if ongoing lung cancer screenings are right for you.  For informational purposes only. Not to replace the advice of your health care provider. Copyright   2023 [employer]. All rights reserved. Clinically reviewed by the [facility]itions Program. [employer] [id number] - REV 01/24.

## 2024-03-26 NOTE — PROGRESS NOTES
Preventive Care Visit  [facility]  Maulik Lee MD, Family Medicine  Mar 27, 2024      Assessment & Plan     Routine general medical examination at a health care facility  - discussed preventative guidelines, healthy diet, exercise and weight management    Anxiety  stable/controlled. Cont current medication(s) and treatment  - citalopram (CELEXA) 20 MG tablet; Take 1 tablet (20 mg) by mouth daily    Current mild episode of major depressive disorder without prior episode (H24)  stable/controlled. Cont current medication(s) and treatment  - citalopram (CELEXA) 20 MG tablet; Take 1 tablet (20 mg) by mouth daily    Essential hypertension  stable/controlled. Cont current medication(s) and treatment  - Comprehensive metabolic panel  - VENOUS COLLECTION  - CT Calcium Screening; Future    Prostate cancer screening  The natural history of prostate cancer and ongoing controversy regarding screening and potential treatment outcomes of prostate cancer has been discussed with the patient. The meaning of a false positive PSA and a false negative PSA has been discussed, as well as the concept of overdiagnosis.  He indicates understanding of the limitations of this screening test and wishes to proceed with screening PSA testing.   - PSA, screen    Encounter for screening for cardiovascular disorders  Recommend calcium scan given ASCVD risk score.  Discussed options  - Lipid Profile ([facility])    Encounter for screening for hematologic disorder    - CBC with Diff/Plt ([facility])    Mild persistent asthma without complication  stable/controlled. Cont current medication(s) and treatment  - albuterol (VENTOLIN HFA) 108 (90 Base) MCG/ACT inhaler; Inhale 2 puffs into the lungs every 6 hours as needed for wheezing or shortness of breath  - fluticasone-salmeterol (ADVAIR DISKUS) 100-50 MCG/ACT inhaler; Inhale 1 puff into the lungs every 12 hours    Patient has been advised of split billing requirements and indicates understanding:  "Yes          BMI  Estimated body mass index is 31.47 kg/m  as calculated from the following:    Height as of this encounter: 1.727 m (5' 8\").    Weight as of this encounter: 93.9 kg (207 lb).   Weight management plan: Discussed healthy diet and exercise guidelines    Counseling  Appropriate preventive services were discussed with this patient, including applicable screening as appropriate for fall prevention, nutrition, physical activity, Tobacco-use cessation, weight loss and cognition.  Checklist reviewing preventive services available has been given to the patient.  Reviewed patient's diet, addressing concerns and/or questions.       See Patient Instructions    Return in about 53 weeks (around 4/2/2025) for Annual Wellness Visit.    Ronak GARCIA is a 58 year old, presenting for the following:  Physical (Had fasting labs drawn this AM) and Health Maintenance (Colon Screen: Colonoscopy 3/2023 - Due 3/2025)         Health Care Directive  Patient does not have a Health Care Directive or Living Will: Discussed advance care planning with patient; information given to patient to review.    HPI    Anxiety and MDD: improved on citalopram 20.  Tolerating well    HTN: doing well on lisinopril    Asthma: doing well.  Does not use advair daily.  Rare use of albuerol            3/20/2024   General Health   How would you rate your overall physical health? Good   Feel stress (tense, anxious, or unable to sleep) Not at all         3/20/2024   Nutrition   Three or more servings of calcium each day? Yes   Diet: Regular (no restrictions)   How many servings of fruit and vegetables per day? (!) 2-3   How many sweetened beverages each day? 0-1         3/20/2024   Exercise   Days per week of moderate/strenous exercise 5 days   Average minutes spent exercising at this level 40 min         3/20/2024   Social Factors   Frequency of gathering with friends or relatives Twice a week   Worry food won't last until get money to buy more " "No   Food not last or not have enough money for food? No   Do you have housing?  Yes   Are you worried about losing your housing? No   Lack of transportation? No   Unable to get utilities (heat,electricity)? No         3/27/2024   Fall Risk   Fallen 2 or more times in the past year? No   Trouble with walking or balance? No          3/20/2024   Dental   Dentist two times every year? Yes         3/20/2024   TB Screening   Were you born outside of the [place]? No         Today's PHQ-9 Score:       3/27/2024     2:18 PM   PHQ-9 SCORE   PHQ-9 Total Score 3           3/20/2024   Substance Use   Alcohol more than 3/day or more than 7/wk No   Do you use any other substances recreationally? No     Social History     Tobacco Use    Smoking status: Never    Smokeless tobacco: Never   Substance Use Topics    Alcohol use: Yes     Alcohol/week: 5.0 standard drinks of alcohol     Types: 6 Cans of beer per week     Comment: 7 per week    Drug use: No           3/20/2024   STI Screening   New sexual partner(s) since last STI/HIV test? No   Last PSA:   PSA   Date Value Ref Range Status   12/05/2020 0.9 0 - 4.0 ng/mL Final     ASCVD Risk   The 10-year ASCVD risk score (Neel VACA, et al., 2019) is: 10.5%    Values used to calculate the score:      Age: 58 years      Sex: Male      Is Non- : No      Diabetic: No      Tobacco smoker: No      Systolic Blood Pressure: 123 mmHg      Is BP treated: Yes      HDL Cholesterol: 32 mg/dL      Total Cholesterol: 173 mg/dL           Reviewed and updated as needed this visit by Provider                          Review of Systems  Constitutional, HEENT, cardiovascular, pulmonary, GI, , musculoskeletal, neuro, skin, endocrine and psych systems are negative, except as otherwise noted.     Objective    Exam  /71   Pulse 61   Ht 1.727 m (5' 8\")   Wt 93.9 kg (207 lb)   SpO2 97%   BMI 31.47 kg/m     Estimated body mass index is 31.47 kg/m  as calculated from the " "following:    Height as of this encounter: 1.727 m (5' 8\").    Weight as of this encounter: 93.9 kg (207 lb).    Physical Exam  GENERAL: alert and no distress  EYES: Eyes grossly normal to inspection, PERRL and conjunctivae and sclerae normal  HENT: ear canals and TM's normal, nose and mouth without ulcers or lesions  NECK: no adenopathy, no asymmetry, masses, or scars  RESP: lungs clear to auscultation - no rales, rhonchi or wheezes  CV: regular rate and rhythm, normal S1 S2, no S3 or S4, no murmur, click or rub, no peripheral edema  ABDOMEN: soft, nontender, no hepatosplenomegaly, no masses and bowel sounds normal  RECTAL: normal sphincter tone, no rectal masses, prostate normal size, smooth, nontender without nodules or masses  MS: no gross musculoskeletal defects noted, no edema  SKIN: no suspicious lesions or rashes  NEURO: Normal strength and tone, mentation intact and speech normal  PSYCH: mentation appears normal, affect normal/bright        Signed Electronically by: Maulik Lee MD    "

## 2024-03-27 ENCOUNTER — OFFICE VISIT (OUTPATIENT)
Dept: FAMILY MEDICINE | Facility: CLINIC | Age: 59
End: 2024-03-27

## 2024-03-27 VITALS
HEIGHT: 68 IN | SYSTOLIC BLOOD PRESSURE: 123 MMHG | OXYGEN SATURATION: 97 % | DIASTOLIC BLOOD PRESSURE: 71 MMHG | WEIGHT: 207 LBS | BODY MASS INDEX: 31.37 KG/M2 | HEART RATE: 61 BPM

## 2024-03-27 DIAGNOSIS — Z13.0 ENCOUNTER FOR SCREENING FOR HEMATOLOGIC DISORDER: ICD-10-CM

## 2024-03-27 DIAGNOSIS — I10 ESSENTIAL HYPERTENSION: ICD-10-CM

## 2024-03-27 DIAGNOSIS — Z12.5 PROSTATE CANCER SCREENING: ICD-10-CM

## 2024-03-27 DIAGNOSIS — Z00.00 ROUTINE GENERAL MEDICAL EXAMINATION AT A HEALTH CARE FACILITY: Primary | ICD-10-CM

## 2024-03-27 DIAGNOSIS — F32.0 CURRENT MILD EPISODE OF MAJOR DEPRESSIVE DISORDER WITHOUT PRIOR EPISODE (H): ICD-10-CM

## 2024-03-27 DIAGNOSIS — Z13.6 ENCOUNTER FOR SCREENING FOR CARDIOVASCULAR DISORDERS: ICD-10-CM

## 2024-03-27 DIAGNOSIS — J45.30 MILD PERSISTENT ASTHMA WITHOUT COMPLICATION: ICD-10-CM

## 2024-03-27 DIAGNOSIS — F41.9 ANXIETY: ICD-10-CM

## 2024-03-27 LAB
% GRANULOCYTES: 71.8 % (ref 42.2–75.2)
ALBUMIN SERPL BCG-MCNC: 4.1 G/DL (ref 3.5–5.2)
ALP SERPL-CCNC: 88 U/L (ref 40–150)
ALT SERPL W P-5'-P-CCNC: 23 U/L (ref 0–70)
ANION GAP SERPL CALCULATED.3IONS-SCNC: 11 MMOL/L (ref 7–15)
AST SERPL W P-5'-P-CCNC: 22 U/L (ref 0–45)
BILIRUB SERPL-MCNC: 0.4 MG/DL
BUN SERPL-MCNC: 18.6 MG/DL (ref 8–23)
CALCIUM SERPL-MCNC: 9.2 MG/DL (ref 8.6–10)
CHLORIDE SERPL-SCNC: 102 MMOL/L (ref 98–107)
CHOLESTEROL: 173 MG/DL (ref 100–199)
CREAT SERPL-MCNC: 0.79 MG/DL (ref 0.67–1.17)
DEPRECATED HCO3 PLAS-SCNC: 25 MMOL/L (ref 22–29)
EGFRCR SERPLBLD CKD-EPI 2021: >90 ML/MIN/1.73M2
FASTING?: YES
GLUCOSE SERPL-MCNC: 99 MG/DL (ref 70–99)
HCT VFR BLD AUTO: 44 % (ref 39–51)
HDL (RMG): 32 MG/DL (ref 40–?)
HEMOGLOBIN: 15.6 G/DL (ref 13.4–17.5)
LDL CALCULATED (RMG): 122 MG/DL (ref 0–130)
LYMPHOCYTES NFR BLD AUTO: 22.3 % (ref 20.5–51.1)
MCH RBC QN AUTO: 30.2 PG (ref 27–31)
MCHC RBC AUTO-ENTMCNC: 35.5 G/DL (ref 33–37)
MCV RBC AUTO: 84.9 FL (ref 80–100)
MONOCYTES NFR BLD AUTO: 5.9 % (ref 1.7–9.3)
PLATELET # BLD AUTO: 263 K/UL (ref 140–450)
POTASSIUM SERPL-SCNC: 4.6 MMOL/L (ref 3.4–5.3)
PROT SERPL-MCNC: 7.1 G/DL (ref 6.4–8.3)
PSA SERPL DL<=0.01 NG/ML-MCNC: 1.25 NG/ML (ref 0–3.5)
RBC # BLD AUTO: 5.18 X10/CMM (ref 4.2–5.9)
SODIUM SERPL-SCNC: 138 MMOL/L (ref 135–145)
TRIGLYCERIDES (RMG): 95 MG/DL (ref 0–149)
WBC # BLD AUTO: 6 X10/CMM (ref 3.8–11)

## 2024-03-27 PROCEDURE — 99396 PREV VISIT EST AGE 40-64: CPT | Performed by: FAMILY MEDICINE

## 2024-03-27 PROCEDURE — G0103 PSA SCREENING: HCPCS | Mod: 90 | Performed by: FAMILY MEDICINE

## 2024-03-27 PROCEDURE — 80053 COMPREHEN METABOLIC PANEL: CPT | Performed by: FAMILY MEDICINE

## 2024-03-27 PROCEDURE — 36415 COLL VENOUS BLD VENIPUNCTURE: CPT | Performed by: FAMILY MEDICINE

## 2024-03-27 PROCEDURE — 80061 LIPID PANEL: CPT | Mod: QW | Performed by: FAMILY MEDICINE

## 2024-03-27 PROCEDURE — G0103 PSA SCREENING: HCPCS | Performed by: FAMILY MEDICINE

## 2024-03-27 PROCEDURE — 85025 COMPLETE CBC W/AUTO DIFF WBC: CPT | Performed by: FAMILY MEDICINE

## 2024-03-27 RX ORDER — FLUTICASONE PROPIONATE AND SALMETEROL 100; 50 UG/1; UG/1
1 POWDER RESPIRATORY (INHALATION) EVERY 12 HOURS
Qty: 60 EACH | Refills: 3 | Status: SHIPPED | OUTPATIENT
Start: 2024-03-27

## 2024-03-27 RX ORDER — CITALOPRAM HYDROBROMIDE 20 MG/1
20 TABLET ORAL DAILY
Qty: 90 TABLET | Refills: 1 | Status: SHIPPED | OUTPATIENT
Start: 2024-03-27 | End: 2024-09-13

## 2024-03-27 RX ORDER — ALBUTEROL SULFATE 90 UG/1
2 AEROSOL, METERED RESPIRATORY (INHALATION) EVERY 6 HOURS PRN
Qty: 18 G | Refills: 1 | Status: SHIPPED | OUTPATIENT
Start: 2024-03-27

## 2024-03-27 ASSESSMENT — PATIENT HEALTH QUESTIONNAIRE - PHQ9
SUM OF ALL RESPONSES TO PHQ QUESTIONS 1-9: 3
5. POOR APPETITE OR OVEREATING: NOT AT ALL

## 2024-03-27 ASSESSMENT — ANXIETY QUESTIONNAIRES
1. FEELING NERVOUS, ANXIOUS, OR ON EDGE: NOT AT ALL
IF YOU CHECKED OFF ANY PROBLEMS ON THIS QUESTIONNAIRE, HOW DIFFICULT HAVE THESE PROBLEMS MADE IT FOR YOU TO DO YOUR WORK, TAKE CARE OF THINGS AT HOME, OR GET ALONG WITH OTHER PEOPLE: NOT DIFFICULT AT ALL
6. BECOMING EASILY ANNOYED OR IRRITABLE: NOT AT ALL
2. NOT BEING ABLE TO STOP OR CONTROL WORRYING: SEVERAL DAYS
7. FEELING AFRAID AS IF SOMETHING AWFUL MIGHT HAPPEN: NOT AT ALL
5. BEING SO RESTLESS THAT IT IS HARD TO SIT STILL: NOT AT ALL
GAD7 TOTAL SCORE: 2
3. WORRYING TOO MUCH ABOUT DIFFERENT THINGS: SEVERAL DAYS
GAD7 TOTAL SCORE: 2

## 2024-03-27 ASSESSMENT — ASTHMA QUESTIONNAIRES: ACT_TOTALSCORE: 24

## 2024-03-27 NOTE — LETTER
My Asthma Action Plan    Name: Bill Saunders   YOB: 1965  Date: 3/27/2024   My doctor: Maulik Lee MD   My clinic: [facility]        My Rescue Medicine:   Albuterol inhaler (Proair/Ventolin/Proventil HFA)  2-4 puffs EVERY 4 HOURS as needed. Use a spacer if recommended by your provider.   My Asthma Severity:   Intermittent / Exercise Induced  Know your asthma triggers: Patient is unaware of triggers             GREEN ZONE   Good Control  I feel good  No cough or wheeze  Can work, sleep and play without asthma symptoms       Take your asthma control medicine every day.     If exercise triggers your asthma, take your rescue medication  15 minutes before exercise or sports, and  During exercise if you have asthma symptoms  Spacer to use with inhaler: If you have a spacer, make sure to use it with your inhaler             YELLOW ZONE Getting Worse  I have ANY of these:  I do not feel good  Cough or wheeze  Chest feels tight  Wake up at night   Keep taking your Green Zone medications  Start taking your rescue medicine:  every 20 minutes for up to 1 hour. Then every 4 hours for 24-48 hours.  If you stay in the Yellow Zone for more than 12-24 hours, contact your doctor.  If you do not return to the Green Zone in 12-24 hours or you get worse, start taking your oral steroid medicine if prescribed by your provider.           RED ZONE Medical Alert - Get Help  I have ANY of these:  I feel awful  Medicine is not helping  Breathing getting harder  Trouble walking or talking  Nose opens wide to breathe       Take your rescue medicine NOW  If your provider has prescribed an oral steroid medicine, start taking it NOW  Call your doctor NOW  If you are still in the Red Zone after 20 minutes and you have not reached your doctor:  Take your rescue medicine again and  Call 911 or go to the emergency room right away    See your regular doctor within 2 weeks of an Emergency Room or Urgent Care visit for  follow-up treatment.          Annual Reminders:  Meet with Asthma Educator,  Flu Shot in the Fall, consider Pneumonia Vaccination for patients with asthma (aged 19 and older).    Pharmacy:    [facility] - [place], [place] - [address]  WRITTEN PRESCRIPTION REQUESTED  [id number] - [place], [place] - [address] [place] [address] DELIVERY - [place], [place] - [address]    Electronically signed by Mualik Lee MD   Date: 03/27/24                    Asthma Triggers  How To Control Things That Make Your Asthma Worse    Triggers are things that make your asthma worse.  Look at the list below to help you find your triggers and   what you can do about them. You can help prevent asthma flare-ups by staying away from your triggers.      Trigger                                                          What you can do   Cigarette Smoke  Tobacco smoke can make asthma worse. Do not allow smoking in your home, car or around you.  Be sure no one smokes at a child s day care or school.  If you smoke, ask your health care provider for ways to help you quit.  Ask family members to quit too.  Ask your health care provider for a referral to Quit Plan to help you quit smoking, or call 5-806-707-PLAN.     Colds, Flu, Bronchitis  These are common triggers of asthma. Wash your hands often.  Don t touch your eyes, nose or mouth.  Get a flu shot every year.     Dust Mites  These are tiny bugs that live in cloth or carpet. They are too small to see. Wash sheets and blankets in hot water every week.   Encase pillows and mattress in dust mite proof covers.  Avoid having carpet if you can. If you have carpet, vacuum weekly.   Use a dust mask and HEPA vacuum.   Pollen and Outdoor Mold  Some people are allergic to trees, grass, or weed pollen, or molds. Try to keep your windows closed.  Limit time out doors when pollen count is high.   Ask you health care provider about taking medicine during  allergy season.     Animal Dander  Some people are allergic to skin flakes, urine or saliva from pets with fur or feathers. Keep pets with fur or feathers out of your home.    If you can t keep the pet outdoors, then keep the pet out of your bedroom.  Keep the bedroom door closed.  Keep pets off cloth furniture and away from stuffed toys.     Mice, Rats, and Cockroaches  Some people are allergic to the waste from these pests.   Cover food and garbage.  Clean up spills and food crumbs.  Store grease in the refrigerator.   Keep food out of the bedroom.   Indoor Mold  This can be a trigger if your home has high moisture. Fix leaking faucets, pipes, or other sources of water.   Clean moldy surfaces.  Dehumidify basement if it is damp and smelly.   Smoke, Strong Odors, and Sprays  These can reduce air quality. Stay away from strong odors and sprays, such as perfume, powder, hair spray, paints, smoke incense, paint, cleaning products, candles and new carpet.   Exercise or Sports  Some people with asthma have this trigger. Be active!  Ask your doctor about taking medicine before sports or exercise to prevent symptoms.    Warm up for 5-10 minutes before and after sports or exercise.     Other Triggers of Asthma  Cold air:  Cover your nose and mouth with a scarf.  Sometimes laughing or crying can be a trigger.  Some medicines and food can trigger asthma.

## 2024-04-08 ENCOUNTER — TRANSFERRED RECORDS (OUTPATIENT)
Dept: FAMILY MEDICINE | Facility: CLINIC | Age: 59
End: 2024-04-08

## 2024-05-19 DIAGNOSIS — I10 ESSENTIAL HYPERTENSION, BENIGN: ICD-10-CM

## 2024-05-19 RX ORDER — LISINOPRIL 10 MG/1
10 TABLET ORAL DAILY
Qty: 30 TABLET | Refills: 6 | Status: SHIPPED | OUTPATIENT
Start: 2024-05-19

## 2024-09-11 DIAGNOSIS — F32.0 CURRENT MILD EPISODE OF MAJOR DEPRESSIVE DISORDER WITHOUT PRIOR EPISODE (H): ICD-10-CM

## 2024-09-11 DIAGNOSIS — F41.9 ANXIETY: ICD-10-CM

## 2024-09-12 NOTE — TELEPHONE ENCOUNTER
Med:  CELEXA 20 MG tablet     LOV (related): 3/27/24 CPX      Due for F/U around: March 2025    Next Appt: none            8/3/2023     8:00 PM 9/6/2023     4:59 PM 3/27/2024     2:18 PM   PHQ   PHQ-9 Total Score 3 5 3   Q9: Thoughts of better off dead/self-harm past 2 weeks Not at all Not at all Not at all           8/3/2023     8:10 PM 9/6/2023     4:59 PM 3/27/2024     2:18 PM   CHANO-7 SCORE   Total Score 2 (minimal anxiety)     Total Score 2 11 2

## 2024-09-13 RX ORDER — CITALOPRAM HYDROBROMIDE 20 MG
20 TABLET ORAL DAILY
Qty: 90 TABLET | Refills: 1 | Status: SHIPPED | OUTPATIENT
Start: 2024-09-13

## 2024-12-16 DIAGNOSIS — I10 ESSENTIAL HYPERTENSION, BENIGN: ICD-10-CM

## 2024-12-16 RX ORDER — LISINOPRIL 10 MG/1
10 TABLET ORAL DAILY
Qty: 30 TABLET | Refills: 6 | Status: SHIPPED | OUTPATIENT
Start: 2024-12-16

## 2024-12-16 NOTE — TELEPHONE ENCOUNTER
Med: Lisinopril    LOV (related): 3/27/24    Last Lab: 3/27/24      Due for F/U around: 3/27/25    Next Appt: Not Scheduled        BP Readings from Last 3 Encounters:   03/27/24 123/71   12/06/23 133/80   09/06/23 105/66       Last Comprehensive Metabolic Panel:  Lab Results   Component Value Date     03/27/2024    POTASSIUM 4.6 03/27/2024    CHLORIDE 102 03/27/2024    CO2 25 03/27/2024    ANIONGAP 11 03/27/2024    GLC 99 03/27/2024    BUN 18.6 03/27/2024    CR 0.79 03/27/2024    GFRESTIMATED >90 03/27/2024    PREET 9.2 03/27/2024

## 2025-01-11 DIAGNOSIS — J45.30 MILD PERSISTENT ASTHMA WITHOUT COMPLICATION: ICD-10-CM

## 2025-01-13 RX ORDER — MONTELUKAST SODIUM 10 MG/1
1 TABLET ORAL DAILY
Qty: 30 TABLET | Refills: 11 | Status: SHIPPED | OUTPATIENT
Start: 2025-01-13

## 2025-01-13 NOTE — CONFIDENTIAL NOTE
Med: MONTELUKAST    LOV (related): 3/27/24 - CPX    Last ACT Date: 3/27/24  Last ACT Score: 24    Last AAP Date: 3/27/24  Last AAP Letter Date: 3/27/24      Due for F/U around: 3/2025 FOR CPX    Next Appt: NONE

## 2025-02-10 ENCOUNTER — OFFICE VISIT (OUTPATIENT)
Dept: FAMILY MEDICINE | Facility: CLINIC | Age: 60
End: 2025-02-10

## 2025-02-10 VITALS
HEIGHT: 71 IN | BODY MASS INDEX: 30.04 KG/M2 | HEART RATE: 68 BPM | WEIGHT: 214.6 LBS | OXYGEN SATURATION: 97 % | SYSTOLIC BLOOD PRESSURE: 128 MMHG | DIASTOLIC BLOOD PRESSURE: 65 MMHG

## 2025-02-10 DIAGNOSIS — F41.9 ANXIETY: Primary | ICD-10-CM

## 2025-02-10 DIAGNOSIS — F32.0 CURRENT MILD EPISODE OF MAJOR DEPRESSIVE DISORDER WITHOUT PRIOR EPISODE: ICD-10-CM

## 2025-02-10 PROCEDURE — G2211 COMPLEX E/M VISIT ADD ON: HCPCS

## 2025-02-10 PROCEDURE — 99214 OFFICE O/P EST MOD 30 MIN: CPT

## 2025-02-10 ASSESSMENT — ANXIETY QUESTIONNAIRES
GAD7 TOTAL SCORE: 16
2. NOT BEING ABLE TO STOP OR CONTROL WORRYING: NEARLY EVERY DAY
3. WORRYING TOO MUCH ABOUT DIFFERENT THINGS: NEARLY EVERY DAY
IF YOU CHECKED OFF ANY PROBLEMS ON THIS QUESTIONNAIRE, HOW DIFFICULT HAVE THESE PROBLEMS MADE IT FOR YOU TO DO YOUR WORK, TAKE CARE OF THINGS AT HOME, OR GET ALONG WITH OTHER PEOPLE: SOMEWHAT DIFFICULT
1. FEELING NERVOUS, ANXIOUS, OR ON EDGE: NEARLY EVERY DAY
GAD7 TOTAL SCORE: 16
6. BECOMING EASILY ANNOYED OR IRRITABLE: MORE THAN HALF THE DAYS
5. BEING SO RESTLESS THAT IT IS HARD TO SIT STILL: SEVERAL DAYS
7. FEELING AFRAID AS IF SOMETHING AWFUL MIGHT HAPPEN: NEARLY EVERY DAY

## 2025-02-10 ASSESSMENT — PATIENT HEALTH QUESTIONNAIRE - PHQ9
SUM OF ALL RESPONSES TO PHQ QUESTIONS 1-9: 9
5. POOR APPETITE OR OVEREATING: SEVERAL DAYS

## 2025-02-10 NOTE — PATIENT INSTRUCTIONS
"ANXIETY:    *  Start Medication to help control and prevent the symptoms of anxiety.       --Start Sertraline 50 mg once per day.  Main side effects include decreased or increased appetite, changes in sleep pattern, intestinal upset.  Stop the medicaiton if you develop any adverse symptoms or notice a change in your mood for the worse, i.e. more depressed, more anxious, or any thoughts of self harm.    *  Call [phone]  with any questions or concerns about medication side effects.    *  Google \"CBT for panic\" and check out the book \"Mind over Mood\" for further information on other methods of managing anxiety symptoms beyond medications.     *  Visit the web site National [place] of Mental Health web site (http://www.nimh.nih.gov/index.shtml)  for more information on anxiety, depression, and other conditions     *  Return to see me in  1 month regardless of how you feel to follow up on these issues.        Resources for any acute mental health issues:    *[facility] Crisis: [facility]: ([phone]) 24 hour mobile crisis support for people having a mental health crisis in [facility].    *Acute Psychiatric Services ([phone]). 24-hour walk-in crisis psychiatric support at [facility]; Emergency [facility] available 7:30am - 2:00pm  *[facility]risis [facility]: ([phone]) 24-hour confidential telephone counseling    *[facility] Emergency Room: [phone]   "

## 2025-02-10 NOTE — PROGRESS NOTES
"  Assessment & Plan     Anxiety  Current mild episode of major depressive disorder without prior episode  Patient presents for anxiety and depression follow up. Reports currently taking Citalopram 20 mg daily. Possible experiencing side effects with the medication. Will stop the Citalopram and start a trail Sertraline 50 mg daily. Education about adverse effects of prescription medication discussed. Red flags that warrant emergent evaluation discussed. Patient verbalized understanding and is agreeable to the discussed plan of care. All questions answered. Follow up in 1 month or sooner as needed.   - sertraline (ZOLOFT) 50 MG tablet  Dispense: 90 tablet; Refill: 0            BMI  Estimated body mass index is 30.36 kg/m  as calculated from the following:    Height as of this encounter: 1.791 m (5' 10.5\").    Weight as of this encounter: 97.3 kg (214 lb 9.6 oz).   Weight management plan: Discussed healthy diet and exercise guidelines      Work on weight loss  Regular exercise  See Patient Instructions    Return in about 4 weeks (around 3/10/2025) for Follow up.    The longitudinal plan of care for the diagnosis(es)/condition(s) as documented were addressed during this visit. Due to the added complexity in care, I will continue to support JOSE in the subsequent management and with ongoing continuity of care.      Subjective   JOSE is a 59 year old, presenting for the following health issues:  Mental Health Problem (Pt has been experiencing panic attacks, no motivation to do things, fear of leaving the house, shaking due to worry/anxiousness unsure how long it has been going on)    HPI       Anxiety/MDD: Taking Celexa 20 mg daily. Sometimes going well and feels normal. Other times fuzzy. Felt like he was going to start shaking. States he worries about everything. Really has to concentrate when typically it was naturally. Usually very active but not over thinking and very worried. Some improvement with Celexa but " "stomach pains. Not sure from worrying. Taking many naps throughout the day. Average 4 hours when there are things to do. Feeling shaky in the morning as well.         9/6/2023     4:59 PM 3/27/2024     2:18 PM 2/10/2025     9:28 AM   CHANO-7 SCORE   Total Score 11 2 16            9/6/2023     4:59 PM 3/27/2024     2:18 PM 2/10/2025     9:28 AM   PHQ   PHQ-9 Total Score 5 3 9   Q9: Thoughts of better off dead/self-harm past 2 weeks Not at all Not at all Not at all          Review of Systems  Constitutional, HEENT, cardiovascular, pulmonary, GI, , musculoskeletal, neuro, skin, endocrine and psych systems are negative, except as otherwise noted.      Objective    /65   Pulse 68   Ht 1.791 m (5' 10.5\")   Wt 97.3 kg (214 lb 9.6 oz)   SpO2 97%   BMI 30.36 kg/m    Body mass index is 30.36 kg/m .  Physical Exam   GENERAL: alert and no distress  EYES: Eyes grossly normal to inspection, PERRL and conjunctivae and sclerae normal  RESP: respirations even and unlabored.   PSYCH: mentation appears normal, affect normal/bright          Signed Electronically by: SHAVON Garnica CNP    "

## 2025-02-25 DIAGNOSIS — F32.0 CURRENT MILD EPISODE OF MAJOR DEPRESSIVE DISORDER WITHOUT PRIOR EPISODE: ICD-10-CM

## 2025-02-25 DIAGNOSIS — F41.9 ANXIETY: ICD-10-CM

## 2025-02-25 RX ORDER — CITALOPRAM HYDROBROMIDE 20 MG
20 TABLET ORAL DAILY
Qty: 0.1 TABLET | Refills: 0 | OUTPATIENT
Start: 2025-02-25

## 2025-02-25 NOTE — TELEPHONE ENCOUNTER
"Danielle's note from 2/10/2025 states patient will stop taking Celexa and will \"start a trail sertraline 50 mg daily\".  90 days of 50 mg sertraline prescribed at visit. This prescription seems like an error. I put the quantity as 0.1 tablets so the pharmacy can't actually fill the prescription unless Danielle changes it.    Med: Celexa    LOV (related): 2/10/2025    Due for F/U around: 3/10/2025 or sooner if needed    Next Appt: None scheduled            9/6/2023     4:59 PM 3/27/2024     2:18 PM 2/10/2025     9:28 AM   PHQ   PHQ-9 Total Score 5 3 9   Q9: Thoughts of better off dead/self-harm past 2 weeks Not at all Not at all Not at all           9/6/2023     4:59 PM 3/27/2024     2:18 PM 2/10/2025     9:28 AM   CHANO-7 SCORE   Total Score 11 2 16         "

## 2025-03-06 ENCOUNTER — OFFICE VISIT (OUTPATIENT)
Dept: FAMILY MEDICINE | Facility: CLINIC | Age: 60
End: 2025-03-06

## 2025-03-06 VITALS
DIASTOLIC BLOOD PRESSURE: 67 MMHG | WEIGHT: 209.4 LBS | HEART RATE: 65 BPM | BODY MASS INDEX: 29.62 KG/M2 | OXYGEN SATURATION: 97 % | SYSTOLIC BLOOD PRESSURE: 116 MMHG

## 2025-03-06 DIAGNOSIS — F41.9 ANXIETY: Primary | ICD-10-CM

## 2025-03-06 DIAGNOSIS — F32.0 CURRENT MILD EPISODE OF MAJOR DEPRESSIVE DISORDER WITHOUT PRIOR EPISODE: ICD-10-CM

## 2025-03-06 RX ORDER — SERTRALINE HYDROCHLORIDE 100 MG/1
100 TABLET, FILM COATED ORAL DAILY
Qty: 90 TABLET | Refills: 0 | Status: SHIPPED | OUTPATIENT
Start: 2025-03-06

## 2025-03-06 ASSESSMENT — ANXIETY QUESTIONNAIRES
5. BEING SO RESTLESS THAT IT IS HARD TO SIT STILL: NOT AT ALL
GAD7 TOTAL SCORE: 12
3. WORRYING TOO MUCH ABOUT DIFFERENT THINGS: MORE THAN HALF THE DAYS
GAD7 TOTAL SCORE: 12
7. FEELING AFRAID AS IF SOMETHING AWFUL MIGHT HAPPEN: NEARLY EVERY DAY
6. BECOMING EASILY ANNOYED OR IRRITABLE: NOT AT ALL
8. IF YOU CHECKED OFF ANY PROBLEMS, HOW DIFFICULT HAVE THESE MADE IT FOR YOU TO DO YOUR WORK, TAKE CARE OF THINGS AT HOME, OR GET ALONG WITH OTHER PEOPLE?: SOMEWHAT DIFFICULT
1. FEELING NERVOUS, ANXIOUS, OR ON EDGE: NEARLY EVERY DAY
2. NOT BEING ABLE TO STOP OR CONTROL WORRYING: MORE THAN HALF THE DAYS
4. TROUBLE RELAXING: MORE THAN HALF THE DAYS
IF YOU CHECKED OFF ANY PROBLEMS ON THIS QUESTIONNAIRE, HOW DIFFICULT HAVE THESE PROBLEMS MADE IT FOR YOU TO DO YOUR WORK, TAKE CARE OF THINGS AT HOME, OR GET ALONG WITH OTHER PEOPLE: SOMEWHAT DIFFICULT
GAD7 TOTAL SCORE: 12
7. FEELING AFRAID AS IF SOMETHING AWFUL MIGHT HAPPEN: NEARLY EVERY DAY

## 2025-03-06 ASSESSMENT — ASTHMA QUESTIONNAIRES
ACT_TOTALSCORE: 21
QUESTION_4 LAST FOUR WEEKS HOW OFTEN HAVE YOU USED YOUR RESCUE INHALER OR NEBULIZER MEDICATION (SUCH AS ALBUTEROL): TWO OR THREE TIMES PER WEEK
ACT_TOTALSCORE: 21
QUESTION_5 LAST FOUR WEEKS HOW WOULD YOU RATE YOUR ASTHMA CONTROL: WELL CONTROLLED
QUESTION_2 LAST FOUR WEEKS HOW OFTEN HAVE YOU HAD SHORTNESS OF BREATH: ONCE OR TWICE A WEEK
QUESTION_3 LAST FOUR WEEKS HOW OFTEN DID YOUR ASTHMA SYMPTOMS (WHEEZING, COUGHING, SHORTNESS OF BREATH, CHEST TIGHTNESS OR PAIN) WAKE YOU UP AT NIGHT OR EARLIER THAN USUAL IN THE MORNING: NOT AT ALL
QUESTION_1 LAST FOUR WEEKS HOW MUCH OF THE TIME DID YOUR ASTHMA KEEP YOU FROM GETTING AS MUCH DONE AT WORK, SCHOOL OR AT HOME: NONE OF THE TIME

## 2025-03-06 ASSESSMENT — PATIENT HEALTH QUESTIONNAIRE - PHQ9: SUM OF ALL RESPONSES TO PHQ QUESTIONS 1-9: 7

## 2025-03-06 NOTE — PROGRESS NOTES
Assessment & Plan     Anxiety  Current mild episode of major depressive disorder without prior episode  Patient presents for anxiety and depression follow up. Taking Sertraline 50 mg daily. Some improvement with symptoms. Will increase Sertraline to 100 mg daily. Education about adverse effects of prescription medication discussed. Red flags that warrant emergent evaluation discussed. Patient verbalized understanding and is agreeable to the discussed plan of care. All questions answered. Follow up in 6-8 weeks or sooner as needed.   - sertraline (ZOLOFT) 100 MG tablet  Dispense: 90 tablet; Refill: 0              Work on weight loss  Regular exercise  See Patient Instructions    Return in about 6 weeks (around 4/17/2025) for Follow up.    The longitudinal plan of care for the diagnosis(es)/condition(s) as documented were addressed during this visit. Due to the added complexity in care, I will continue to support JOSE in the subsequent management and with ongoing continuity of care.      Subjective   JOSE is a 59 year old, presenting for the following health issues:  Recheck Medication (Recheck on Zoloft)    History of Present Illness       Mental Health Follow-up:  Patient presents to follow-up on Anxiety.    Patient's anxiety since last visit has been:  Medium  The patient is not having other symptoms associated with anxiety.  Any significant life events: No  Patient is not feeling anxious or having panic attacks.  Patient has no concerns about alcohol or drug use.    He eats 2-3 servings of fruits and vegetables daily.He consumes 1 sweetened beverage(s) daily.He exercises with enough effort to increase his heart rate 60 or more minutes per day.  He exercises with enough effort to increase his heart rate 6 days per week.   He is taking medications regularly.        Anxiety/ MDD: Taking Sertraline 50 mg daily. Wake up and feel super worried about what is going to happen for the day. So much anxiety everyday.  Second guessing himself. No confidence. Wasn't tired all the time. Some dry mouth as a possible side effect. Not as much stomach aches. Overall good things. Feeling cloudy.         3/27/2024     2:18 PM 2/10/2025     9:28 AM 3/6/2025     4:13 PM   CHANO-7 SCORE   Total Score   12 (moderate anxiety)   Total Score 2 16 12        Patient-reported            3/27/2024     2:18 PM 2/10/2025     9:28 AM 3/6/2025     4:41 PM   PHQ   PHQ-9 Total Score 3 9 7   Q9: Thoughts of better off dead/self-harm past 2 weeks Not at all Not at all Not at all        Review of Systems  Constitutional, HEENT, cardiovascular, pulmonary, GI, , musculoskeletal, neuro, skin, endocrine and psych systems are negative, except as otherwise noted.      Objective    /67   Pulse 65   Wt 95 kg (209 lb 6.4 oz)   SpO2 97%   BMI 29.62 kg/m    Body mass index is 29.62 kg/m .  Physical Exam   GENERAL: alert and no distress  EYES: Eyes grossly normal to inspection, PERRL and conjunctivae and sclerae normal  RESP: Respirations even and unlabored  SKIN: no suspicious lesions or rashes  PSYCH: mentation appears normal, affect normal/bright          Signed Electronically by: SHAVON Garnica CNP

## 2025-05-04 ENCOUNTER — HEALTH MAINTENANCE LETTER (OUTPATIENT)
Age: 60
End: 2025-05-04

## 2025-05-18 DIAGNOSIS — F32.0 CURRENT MILD EPISODE OF MAJOR DEPRESSIVE DISORDER WITHOUT PRIOR EPISODE: ICD-10-CM

## 2025-05-18 DIAGNOSIS — F41.9 ANXIETY: ICD-10-CM

## 2025-05-19 RX ORDER — SERTRALINE HYDROCHLORIDE 100 MG/1
100 TABLET, FILM COATED ORAL DAILY
Qty: 30 TABLET | Refills: 2 | Status: SHIPPED | OUTPATIENT
Start: 2025-05-28

## 2025-05-19 NOTE — TELEPHONE ENCOUNTER
Med: Sertraline    LOV (related): 3/6/25      Due for F/U around: 5/2025    Next Appt: None scheduled            3/27/2024     2:18 PM 2/10/2025     9:28 AM 3/6/2025     4:41 PM   PHQ   PHQ-9 Total Score 3 9 7   Q9: Thoughts of better off dead/self-harm past 2 weeks Not at all Not at all Not at all           3/27/2024     2:18 PM 2/10/2025     9:28 AM 3/6/2025     4:13 PM   CHANO-7 SCORE   Total Score   12 (moderate anxiety)   Total Score 2 16 12        Patient-reported